# Patient Record
Sex: FEMALE | Race: WHITE | NOT HISPANIC OR LATINO | Employment: FULL TIME | URBAN - METROPOLITAN AREA
[De-identification: names, ages, dates, MRNs, and addresses within clinical notes are randomized per-mention and may not be internally consistent; named-entity substitution may affect disease eponyms.]

---

## 2019-01-20 ENCOUNTER — HOSPITAL ENCOUNTER (OUTPATIENT)
Dept: RADIOLOGY | Facility: MEDICAL CENTER | Age: 44
End: 2019-01-20

## 2019-01-20 ENCOUNTER — HOSPITAL ENCOUNTER (INPATIENT)
Facility: MEDICAL CENTER | Age: 44
LOS: 5 days | DRG: 981 | End: 2019-01-25
Attending: EMERGENCY MEDICINE | Admitting: SURGERY
Payer: COMMERCIAL

## 2019-01-20 DIAGNOSIS — N83.202 CYST OF LEFT OVARY: ICD-10-CM

## 2019-01-20 DIAGNOSIS — K63.1 PERFORATED BOWEL (HCC): ICD-10-CM

## 2019-01-20 DIAGNOSIS — N83.209 CYST OF OVARY, UNSPECIFIED LATERALITY: ICD-10-CM

## 2019-01-20 DIAGNOSIS — R10.0 ACUTE ABDOMEN: ICD-10-CM

## 2019-01-20 DIAGNOSIS — N85.8 UTERINE MASS: ICD-10-CM

## 2019-01-20 LAB
ABO GROUP BLD: NORMAL
APTT PPP: 23.1 SEC (ref 24.7–36)
BARCODED ABORH UBTYP: 5100
BARCODED PRD CODE UBPRD: NORMAL
BARCODED UNIT NUM UBUNT: NORMAL
BLD GP AB INVEST PLASRBC-IMP: NORMAL
BLD GP AB SCN SERPL QL: NORMAL
COMPONENT R 8504R: NORMAL
EKG IMPRESSION: NORMAL
INR PPP: 1.08 (ref 0.87–1.13)
LACTATE BLD-SCNC: 1.1 MMOL/L (ref 0.5–2)
PRODUCT TYPE UPROD: NORMAL
PROTHROMBIN TIME: 14.1 SEC (ref 12–14.6)
RH BLD: NORMAL
UNIT STATUS USTAT: NORMAL

## 2019-01-20 PROCEDURE — 501838 HCHG SUTURE GENERAL: Performed by: SURGERY

## 2019-01-20 PROCEDURE — 85730 THROMBOPLASTIN TIME PARTIAL: CPT

## 2019-01-20 PROCEDURE — 500424 HCHG DRESSING, AIRSTRIP: Performed by: SURGERY

## 2019-01-20 PROCEDURE — 87205 SMEAR GRAM STAIN: CPT

## 2019-01-20 PROCEDURE — 700102 HCHG RX REV CODE 250 W/ 637 OVERRIDE(OP): Performed by: ANESTHESIOLOGY

## 2019-01-20 PROCEDURE — 304561 HCHG STAT O2

## 2019-01-20 PROCEDURE — 86902 BLOOD TYPE ANTIGEN DONOR EA: CPT | Mod: 91

## 2019-01-20 PROCEDURE — A9270 NON-COVERED ITEM OR SERVICE: HCPCS | Performed by: SURGERY

## 2019-01-20 PROCEDURE — 160002 HCHG RECOVERY MINUTES (STAT): Performed by: SURGERY

## 2019-01-20 PROCEDURE — 0U910ZZ DRAINAGE OF LEFT OVARY, OPEN APPROACH: ICD-10-PCS | Performed by: SURGERY

## 2019-01-20 PROCEDURE — 160048 HCHG OR STATISTICAL LEVEL 1-5: Performed by: SURGERY

## 2019-01-20 PROCEDURE — 160029 HCHG SURGERY MINUTES - 1ST 30 MINS LEVEL 4: Performed by: SURGERY

## 2019-01-20 PROCEDURE — 86850 RBC ANTIBODY SCREEN: CPT

## 2019-01-20 PROCEDURE — 87070 CULTURE OTHR SPECIMN AEROBIC: CPT

## 2019-01-20 PROCEDURE — 770006 HCHG ROOM/CARE - MED/SURG/GYN SEMI*

## 2019-01-20 PROCEDURE — 96365 THER/PROPH/DIAG IV INF INIT: CPT

## 2019-01-20 PROCEDURE — 501445 HCHG STAPLER, SKIN DISP: Performed by: SURGERY

## 2019-01-20 PROCEDURE — 700111 HCHG RX REV CODE 636 W/ 250 OVERRIDE (IP): Performed by: EMERGENCY MEDICINE

## 2019-01-20 PROCEDURE — 700111 HCHG RX REV CODE 636 W/ 250 OVERRIDE (IP)

## 2019-01-20 PROCEDURE — 83605 ASSAY OF LACTIC ACID: CPT

## 2019-01-20 PROCEDURE — 85610 PROTHROMBIN TIME: CPT

## 2019-01-20 PROCEDURE — 500389 HCHG DRAIN, RESERVOIR SUCT JP 100CC: Performed by: SURGERY

## 2019-01-20 PROCEDURE — 86901 BLOOD TYPING SEROLOGIC RH(D): CPT

## 2019-01-20 PROCEDURE — 86905 BLOOD TYPING RBC ANTIGENS: CPT | Mod: 91

## 2019-01-20 PROCEDURE — 700105 HCHG RX REV CODE 258: Performed by: SURGERY

## 2019-01-20 PROCEDURE — A9270 NON-COVERED ITEM OR SERVICE: HCPCS | Performed by: ANESTHESIOLOGY

## 2019-01-20 PROCEDURE — 160035 HCHG PACU - 1ST 60 MINS PHASE I: Performed by: SURGERY

## 2019-01-20 PROCEDURE — 86900 BLOOD TYPING SEROLOGIC ABO: CPT

## 2019-01-20 PROCEDURE — 700101 HCHG RX REV CODE 250

## 2019-01-20 PROCEDURE — 86922 COMPATIBILITY TEST ANTIGLOB: CPT

## 2019-01-20 PROCEDURE — 87075 CULTR BACTERIA EXCEPT BLOOD: CPT

## 2019-01-20 PROCEDURE — 99291 CRITICAL CARE FIRST HOUR: CPT

## 2019-01-20 PROCEDURE — 160009 HCHG ANES TIME/MIN: Performed by: SURGERY

## 2019-01-20 PROCEDURE — 160041 HCHG SURGERY MINUTES - EA ADDL 1 MIN LEVEL 4: Performed by: SURGERY

## 2019-01-20 PROCEDURE — 160036 HCHG PACU - EA ADDL 30 MINS PHASE I: Performed by: SURGERY

## 2019-01-20 PROCEDURE — 700111 HCHG RX REV CODE 636 W/ 250 OVERRIDE (IP): Performed by: SURGERY

## 2019-01-20 PROCEDURE — 700111 HCHG RX REV CODE 636 W/ 250 OVERRIDE (IP): Performed by: ANESTHESIOLOGY

## 2019-01-20 PROCEDURE — 500375 HCHG DRAIN, J-P ROUND 10FR: Performed by: SURGERY

## 2019-01-20 PROCEDURE — 93005 ELECTROCARDIOGRAM TRACING: CPT | Performed by: EMERGENCY MEDICINE

## 2019-01-20 PROCEDURE — 86870 RBC ANTIBODY IDENTIFICATION: CPT

## 2019-01-20 PROCEDURE — 700105 HCHG RX REV CODE 258: Performed by: EMERGENCY MEDICINE

## 2019-01-20 PROCEDURE — 36415 COLL VENOUS BLD VENIPUNCTURE: CPT

## 2019-01-20 PROCEDURE — 94760 N-INVAS EAR/PLS OXIMETRY 1: CPT

## 2019-01-20 PROCEDURE — 700102 HCHG RX REV CODE 250 W/ 637 OVERRIDE(OP): Performed by: SURGERY

## 2019-01-20 PROCEDURE — 96375 TX/PRO/DX INJ NEW DRUG ADDON: CPT

## 2019-01-20 RX ORDER — HALOPERIDOL 5 MG/ML
1 INJECTION INTRAMUSCULAR
Status: DISCONTINUED | OUTPATIENT
Start: 2019-01-20 | End: 2019-01-21

## 2019-01-20 RX ORDER — ACETAMINOPHEN 500 MG
1000 TABLET ORAL EVERY 6 HOURS
Status: DISCONTINUED | OUTPATIENT
Start: 2019-01-21 | End: 2019-01-25 | Stop reason: HOSPADM

## 2019-01-20 RX ORDER — HYDROMORPHONE HYDROCHLORIDE 1 MG/ML
0.2 INJECTION, SOLUTION INTRAMUSCULAR; INTRAVENOUS; SUBCUTANEOUS
Status: DISCONTINUED | OUTPATIENT
Start: 2019-01-20 | End: 2019-01-21

## 2019-01-20 RX ORDER — HYDROMORPHONE HYDROCHLORIDE 1 MG/ML
0.1 INJECTION, SOLUTION INTRAMUSCULAR; INTRAVENOUS; SUBCUTANEOUS
Status: DISCONTINUED | OUTPATIENT
Start: 2019-01-20 | End: 2019-01-21

## 2019-01-20 RX ORDER — OXYCODONE HCL 5 MG/5 ML
10 SOLUTION, ORAL ORAL
Status: COMPLETED | OUTPATIENT
Start: 2019-01-20 | End: 2019-01-20

## 2019-01-20 RX ORDER — HYDRALAZINE HYDROCHLORIDE 20 MG/ML
5 INJECTION INTRAMUSCULAR; INTRAVENOUS
Status: DISCONTINUED | OUTPATIENT
Start: 2019-01-20 | End: 2019-01-21

## 2019-01-20 RX ORDER — HYDROMORPHONE HYDROCHLORIDE 1 MG/ML
0.4 INJECTION, SOLUTION INTRAMUSCULAR; INTRAVENOUS; SUBCUTANEOUS
Status: DISCONTINUED | OUTPATIENT
Start: 2019-01-20 | End: 2019-01-21

## 2019-01-20 RX ORDER — SODIUM CHLORIDE, SODIUM LACTATE, POTASSIUM CHLORIDE, CALCIUM CHLORIDE 600; 310; 30; 20 MG/100ML; MG/100ML; MG/100ML; MG/100ML
INJECTION, SOLUTION INTRAVENOUS CONTINUOUS
Status: DISCONTINUED | OUTPATIENT
Start: 2019-01-20 | End: 2019-01-21

## 2019-01-20 RX ORDER — OXYCODONE HYDROCHLORIDE 5 MG/1
5 TABLET ORAL
Status: DISCONTINUED | OUTPATIENT
Start: 2019-01-20 | End: 2019-01-25 | Stop reason: HOSPADM

## 2019-01-20 RX ORDER — METOPROLOL TARTRATE 1 MG/ML
1 INJECTION, SOLUTION INTRAVENOUS
Status: DISCONTINUED | OUTPATIENT
Start: 2019-01-20 | End: 2019-01-21

## 2019-01-20 RX ORDER — OXYCODONE HCL 5 MG/5 ML
5 SOLUTION, ORAL ORAL
Status: COMPLETED | OUTPATIENT
Start: 2019-01-20 | End: 2019-01-20

## 2019-01-20 RX ORDER — KETOROLAC TROMETHAMINE 30 MG/ML
30 INJECTION, SOLUTION INTRAMUSCULAR; INTRAVENOUS EVERY 6 HOURS
Status: COMPLETED | OUTPATIENT
Start: 2019-01-21 | End: 2019-01-23

## 2019-01-20 RX ORDER — SODIUM CHLORIDE 9 MG/ML
INJECTION, SOLUTION INTRAVENOUS CONTINUOUS
Status: DISCONTINUED | OUTPATIENT
Start: 2019-01-20 | End: 2019-01-21

## 2019-01-20 RX ORDER — HYDROMORPHONE HYDROCHLORIDE 1 MG/ML
0.5 INJECTION, SOLUTION INTRAMUSCULAR; INTRAVENOUS; SUBCUTANEOUS ONCE
Status: COMPLETED | OUTPATIENT
Start: 2019-01-20 | End: 2019-01-20

## 2019-01-20 RX ORDER — OXYCODONE HYDROCHLORIDE 10 MG/1
10 TABLET ORAL
Status: DISCONTINUED | OUTPATIENT
Start: 2019-01-20 | End: 2019-01-25 | Stop reason: HOSPADM

## 2019-01-20 RX ORDER — DIPHENHYDRAMINE HYDROCHLORIDE 50 MG/ML
12.5 INJECTION INTRAMUSCULAR; INTRAVENOUS
Status: DISCONTINUED | OUTPATIENT
Start: 2019-01-20 | End: 2019-01-25 | Stop reason: HOSPADM

## 2019-01-20 RX ORDER — ONDANSETRON 2 MG/ML
4 INJECTION INTRAMUSCULAR; INTRAVENOUS EVERY 4 HOURS PRN
Status: DISCONTINUED | OUTPATIENT
Start: 2019-01-20 | End: 2019-01-25 | Stop reason: HOSPADM

## 2019-01-20 RX ORDER — MEPERIDINE HYDROCHLORIDE 25 MG/ML
12.5 INJECTION INTRAMUSCULAR; INTRAVENOUS; SUBCUTANEOUS
Status: DISCONTINUED | OUTPATIENT
Start: 2019-01-20 | End: 2019-01-21

## 2019-01-20 RX ORDER — SODIUM CHLORIDE, SODIUM LACTATE, POTASSIUM CHLORIDE, CALCIUM CHLORIDE 600; 310; 30; 20 MG/100ML; MG/100ML; MG/100ML; MG/100ML
INJECTION, SOLUTION INTRAVENOUS CONTINUOUS
Status: DISCONTINUED | OUTPATIENT
Start: 2019-01-20 | End: 2019-01-22

## 2019-01-20 RX ORDER — ONDANSETRON 2 MG/ML
4 INJECTION INTRAMUSCULAR; INTRAVENOUS
Status: DISCONTINUED | OUTPATIENT
Start: 2019-01-20 | End: 2019-01-21

## 2019-01-20 RX ADMIN — SODIUM CHLORIDE: 9 INJECTION, SOLUTION INTRAVENOUS at 17:39

## 2019-01-20 RX ADMIN — FENTANYL CITRATE 50 MCG: 50 INJECTION, SOLUTION INTRAMUSCULAR; INTRAVENOUS at 21:34

## 2019-01-20 RX ADMIN — HYDROMORPHONE HYDROCHLORIDE 0.4 MG: 1 INJECTION, SOLUTION INTRAMUSCULAR; INTRAVENOUS; SUBCUTANEOUS at 21:13

## 2019-01-20 RX ADMIN — OXYCODONE HYDROCHLORIDE 10 MG: 5 SOLUTION ORAL at 23:51

## 2019-01-20 RX ADMIN — KETOROLAC TROMETHAMINE 30 MG: 30 INJECTION, SOLUTION INTRAMUSCULAR; INTRAVENOUS at 23:43

## 2019-01-20 RX ADMIN — HYDROMORPHONE HYDROCHLORIDE 0.5 MG: 1 INJECTION, SOLUTION INTRAMUSCULAR; INTRAVENOUS; SUBCUTANEOUS at 17:37

## 2019-01-20 RX ADMIN — FENTANYL CITRATE 50 MCG: 50 INJECTION, SOLUTION INTRAMUSCULAR; INTRAVENOUS at 21:01

## 2019-01-20 RX ADMIN — ACETAMINOPHEN 1000 MG: 500 TABLET ORAL at 23:43

## 2019-01-20 RX ADMIN — HYDROMORPHONE HYDROCHLORIDE 0.2 MG: 1 INJECTION, SOLUTION INTRAMUSCULAR; INTRAVENOUS; SUBCUTANEOUS at 21:20

## 2019-01-20 RX ADMIN — HYDROMORPHONE HYDROCHLORIDE 0.4 MG: 1 INJECTION, SOLUTION INTRAMUSCULAR; INTRAVENOUS; SUBCUTANEOUS at 21:07

## 2019-01-20 RX ADMIN — CEFTRIAXONE SODIUM 2 G: 2 INJECTION, POWDER, FOR SOLUTION INTRAMUSCULAR; INTRAVENOUS at 17:23

## 2019-01-20 RX ADMIN — SODIUM CHLORIDE, POTASSIUM CHLORIDE, SODIUM LACTATE AND CALCIUM CHLORIDE: 600; 310; 30; 20 INJECTION, SOLUTION INTRAVENOUS at 22:37

## 2019-01-20 ASSESSMENT — LIFESTYLE VARIABLES
EVER_SMOKED: YES
HAVE PEOPLE ANNOYED YOU BY CRITICIZING YOUR DRINKING: YES
EVER HAD A DRINK FIRST THING IN THE MORNING TO STEADY YOUR NERVES TO GET RID OF A HANGOVER: YES
TOTAL SCORE: 4
ALCOHOL_USE: YES
ON A TYPICAL DAY WHEN YOU DRINK ALCOHOL HOW MANY DRINKS DO YOU HAVE: 5
CONSUMPTION TOTAL: POSITIVE
EVER FELT BAD OR GUILTY ABOUT YOUR DRINKING: YES
AVERAGE NUMBER OF DAYS PER WEEK YOU HAVE A DRINK CONTAINING ALCOHOL: 7
DOES PATIENT WANT TO STOP DRINKING: YES
TOTAL SCORE: 4
HAVE YOU EVER FELT YOU SHOULD CUT DOWN ON YOUR DRINKING: YES
DOES PATIENT WANT TO TALK TO SOMEONE ABOUT QUITTING: NO
HOW MANY TIMES IN THE PAST YEAR HAVE YOU HAD 5 OR MORE DRINKS IN A DAY: 365
TOTAL SCORE: 4

## 2019-01-20 ASSESSMENT — PAIN SCALES - GENERAL
PAINLEVEL_OUTOF10: 5
PAINLEVEL_OUTOF10: 8
PAINLEVEL_OUTOF10: 5
PAINLEVEL_OUTOF10: 6
PAINLEVEL_OUTOF10: 5
PAINLEVEL_OUTOF10: 7
PAINLEVEL_OUTOF10: 8

## 2019-01-20 ASSESSMENT — COGNITIVE AND FUNCTIONAL STATUS - GENERAL
DAILY ACTIVITIY SCORE: 18
EATING MEALS: A LITTLE
WALKING IN HOSPITAL ROOM: A LITTLE
MOBILITY SCORE: 18
DRESSING REGULAR UPPER BODY CLOTHING: A LITTLE
PERSONAL GROOMING: A LITTLE
TOILETING: A LITTLE
MOVING TO AND FROM BED TO CHAIR: A LITTLE
MOVING FROM LYING ON BACK TO SITTING ON SIDE OF FLAT BED: A LITTLE
HELP NEEDED FOR BATHING: A LITTLE
STANDING UP FROM CHAIR USING ARMS: A LITTLE
CLIMB 3 TO 5 STEPS WITH RAILING: A LITTLE
SUGGESTED CMS G CODE MODIFIER DAILY ACTIVITY: CK
SUGGESTED CMS G CODE MODIFIER MOBILITY: CK
TURNING FROM BACK TO SIDE WHILE IN FLAT BAD: A LITTLE
DRESSING REGULAR LOWER BODY CLOTHING: A LITTLE

## 2019-01-20 ASSESSMENT — PATIENT HEALTH QUESTIONNAIRE - PHQ9
1. LITTLE INTEREST OR PLEASURE IN DOING THINGS: NOT AT ALL
SUM OF ALL RESPONSES TO PHQ9 QUESTIONS 1 AND 2: 0
2. FEELING DOWN, DEPRESSED, IRRITABLE, OR HOPELESS: NOT AT ALL

## 2019-01-20 ASSESSMENT — COPD QUESTIONNAIRES
COPD SCREENING SCORE: 2
DO YOU EVER COUGH UP ANY MUCUS OR PHLEGM?: NO/ONLY WITH OCCASIONAL COLDS OR INFECTIONS
IN THE PAST 12 MONTHS DO YOU DO LESS THAN YOU USED TO BECAUSE OF YOUR BREATHING PROBLEMS: DISAGREE/UNSURE
HAVE YOU SMOKED AT LEAST 100 CIGARETTES IN YOUR ENTIRE LIFE: YES
DURING THE PAST 4 WEEKS HOW MUCH DID YOU FEEL SHORT OF BREATH: NONE/LITTLE OF THE TIME

## 2019-01-20 ASSESSMENT — PAIN DESCRIPTION - DESCRIPTORS: DESCRIPTORS: CRAMPING;SHARP

## 2019-01-21 PROBLEM — R10.0 ACUTE ABDOMEN: Status: ACTIVE | Noted: 2019-01-21

## 2019-01-21 PROBLEM — D64.9 ANEMIA: Status: ACTIVE | Noted: 2019-01-21

## 2019-01-21 LAB
ABO GROUP BLD: NORMAL
ALBUMIN SERPL BCP-MCNC: 3.4 G/DL (ref 3.2–4.9)
ALBUMIN/GLOB SERPL: 1.1 G/DL
ALP SERPL-CCNC: 43 U/L (ref 30–99)
ALT SERPL-CCNC: 7 U/L (ref 2–50)
ANION GAP SERPL CALC-SCNC: 4 MMOL/L (ref 0–11.9)
ANISOCYTOSIS BLD QL SMEAR: ABNORMAL
AST SERPL-CCNC: 11 U/L (ref 12–45)
BASOPHILS # BLD AUTO: 0.2 % (ref 0–1.8)
BASOPHILS # BLD AUTO: 0.3 % (ref 0–1.8)
BASOPHILS # BLD: 0.04 K/UL (ref 0–0.12)
BASOPHILS # BLD: 0.05 K/UL (ref 0–0.12)
BILIRUB SERPL-MCNC: 0.5 MG/DL (ref 0.1–1.5)
BUN SERPL-MCNC: 12 MG/DL (ref 8–22)
CALCIUM SERPL-MCNC: 8.7 MG/DL (ref 8.5–10.5)
CHLORIDE SERPL-SCNC: 110 MMOL/L (ref 96–112)
CO2 SERPL-SCNC: 24 MMOL/L (ref 20–33)
CREAT SERPL-MCNC: 0.78 MG/DL (ref 0.5–1.4)
EOSINOPHIL # BLD AUTO: 0.05 K/UL (ref 0–0.51)
EOSINOPHIL # BLD AUTO: 0.23 K/UL (ref 0–0.51)
EOSINOPHIL NFR BLD: 0.3 % (ref 0–6.9)
EOSINOPHIL NFR BLD: 1.2 % (ref 0–6.9)
ERYTHROCYTE [DISTWIDTH] IN BLOOD BY AUTOMATED COUNT: 48.5 FL (ref 35.9–50)
ERYTHROCYTE [DISTWIDTH] IN BLOOD BY AUTOMATED COUNT: 54.8 FL (ref 35.9–50)
GLOBULIN SER CALC-MCNC: 3.2 G/DL (ref 1.9–3.5)
GLUCOSE SERPL-MCNC: 99 MG/DL (ref 65–99)
GRAM STN SPEC: NORMAL
HCT VFR BLD AUTO: 18.7 % (ref 37–47)
HCT VFR BLD AUTO: 24 % (ref 37–47)
HGB BLD-MCNC: 4.9 G/DL (ref 12–16)
HGB BLD-MCNC: 6.8 G/DL (ref 12–16)
HYPOCHROMIA BLD QL SMEAR: ABNORMAL
IMM GRANULOCYTES # BLD AUTO: 0.14 K/UL (ref 0–0.11)
IMM GRANULOCYTES # BLD AUTO: 0.17 K/UL (ref 0–0.11)
IMM GRANULOCYTES NFR BLD AUTO: 0.8 % (ref 0–0.9)
IMM GRANULOCYTES NFR BLD AUTO: 0.9 % (ref 0–0.9)
LYMPHOCYTES # BLD AUTO: 1.14 K/UL (ref 1–4.8)
LYMPHOCYTES # BLD AUTO: 1.46 K/UL (ref 1–4.8)
LYMPHOCYTES NFR BLD: 5.7 % (ref 22–41)
LYMPHOCYTES NFR BLD: 7.9 % (ref 22–41)
MACROCYTES BLD QL SMEAR: ABNORMAL
MAGNESIUM SERPL-MCNC: 2.5 MG/DL (ref 1.5–2.5)
MCH RBC QN AUTO: 17.6 PG (ref 27–33)
MCH RBC QN AUTO: 20.1 PG (ref 27–33)
MCHC RBC AUTO-ENTMCNC: 26.2 G/DL (ref 33.6–35)
MCHC RBC AUTO-ENTMCNC: 28.3 G/DL (ref 33.6–35)
MCV RBC AUTO: 67 FL (ref 81.4–97.8)
MCV RBC AUTO: 71 FL (ref 81.4–97.8)
MICROCYTES BLD QL SMEAR: ABNORMAL
MONOCYTES # BLD AUTO: 1.26 K/UL (ref 0–0.85)
MONOCYTES # BLD AUTO: 1.46 K/UL (ref 0–0.85)
MONOCYTES NFR BLD AUTO: 6.3 % (ref 0–13.4)
MONOCYTES NFR BLD AUTO: 7.9 % (ref 0–13.4)
NEUTROPHILS # BLD AUTO: 15.42 K/UL (ref 2–7.15)
NEUTROPHILS # BLD AUTO: 17.11 K/UL (ref 2–7.15)
NEUTROPHILS NFR BLD: 82.9 % (ref 44–72)
NEUTROPHILS NFR BLD: 85.6 % (ref 44–72)
NRBC # BLD AUTO: 0 K/UL
NRBC # BLD AUTO: 0.03 K/UL
NRBC BLD-RTO: 0 /100 WBC
NRBC BLD-RTO: 0.2 /100 WBC
OVALOCYTES BLD QL SMEAR: NORMAL
PHOSPHATE SERPL-MCNC: 4.2 MG/DL (ref 2.5–4.5)
PLATELET # BLD AUTO: 342 K/UL (ref 164–446)
PLATELET # BLD AUTO: 380 K/UL (ref 164–446)
PLATELET BLD QL SMEAR: ADEQUATE
PMV BLD AUTO: 8.7 FL (ref 9–12.9)
PMV BLD AUTO: 8.7 FL (ref 9–12.9)
POLYCHROMASIA BLD QL SMEAR: NORMAL
POTASSIUM SERPL-SCNC: 3.7 MMOL/L (ref 3.6–5.5)
PROT SERPL-MCNC: 6.6 G/DL (ref 6–8.2)
RBC # BLD AUTO: 2.79 M/UL (ref 4.2–5.4)
RBC # BLD AUTO: 3.38 M/UL (ref 4.2–5.4)
RBC BLD AUTO: PRESENT
RH BLD: NORMAL
SIGNIFICANT IND 70042: NORMAL
SITE SITE: NORMAL
SODIUM SERPL-SCNC: 138 MMOL/L (ref 135–145)
SOURCE SOURCE: NORMAL
WBC # BLD AUTO: 18.6 K/UL (ref 4.8–10.8)
WBC # BLD AUTO: 20 K/UL (ref 4.8–10.8)

## 2019-01-21 PROCEDURE — 700105 HCHG RX REV CODE 258

## 2019-01-21 PROCEDURE — 85025 COMPLETE CBC W/AUTO DIFF WBC: CPT

## 2019-01-21 PROCEDURE — 700102 HCHG RX REV CODE 250 W/ 637 OVERRIDE(OP): Performed by: SURGERY

## 2019-01-21 PROCEDURE — 86922 COMPATIBILITY TEST ANTIGLOB: CPT | Mod: 91

## 2019-01-21 PROCEDURE — P9016 RBC LEUKOCYTES REDUCED: HCPCS

## 2019-01-21 PROCEDURE — 83735 ASSAY OF MAGNESIUM: CPT

## 2019-01-21 PROCEDURE — 30233N1 TRANSFUSION OF NONAUTOLOGOUS RED BLOOD CELLS INTO PERIPHERAL VEIN, PERCUTANEOUS APPROACH: ICD-10-PCS | Performed by: SURGERY

## 2019-01-21 PROCEDURE — 770006 HCHG ROOM/CARE - MED/SURG/GYN SEMI*

## 2019-01-21 PROCEDURE — 700111 HCHG RX REV CODE 636 W/ 250 OVERRIDE (IP): Performed by: SURGERY

## 2019-01-21 PROCEDURE — 80053 COMPREHEN METABOLIC PANEL: CPT

## 2019-01-21 PROCEDURE — 84100 ASSAY OF PHOSPHORUS: CPT

## 2019-01-21 PROCEDURE — 86902 BLOOD TYPE ANTIGEN DONOR EA: CPT | Mod: 91

## 2019-01-21 PROCEDURE — 700105 HCHG RX REV CODE 258: Performed by: SURGERY

## 2019-01-21 PROCEDURE — 36415 COLL VENOUS BLD VENIPUNCTURE: CPT

## 2019-01-21 PROCEDURE — 99233 SBSQ HOSP IP/OBS HIGH 50: CPT | Performed by: OBSTETRICS & GYNECOLOGY

## 2019-01-21 PROCEDURE — 36430 TRANSFUSION BLD/BLD COMPNT: CPT

## 2019-01-21 PROCEDURE — A9270 NON-COVERED ITEM OR SERVICE: HCPCS | Performed by: SURGERY

## 2019-01-21 RX ORDER — SODIUM CHLORIDE 9 MG/ML
INJECTION, SOLUTION INTRAVENOUS
Status: COMPLETED
Start: 2019-01-21 | End: 2019-01-21

## 2019-01-21 RX ADMIN — SODIUM CHLORIDE 1000 ML: 9 INJECTION, SOLUTION INTRAVENOUS at 09:56

## 2019-01-21 RX ADMIN — OXYCODONE HYDROCHLORIDE 5 MG: 5 TABLET ORAL at 14:37

## 2019-01-21 RX ADMIN — KETOROLAC TROMETHAMINE 30 MG: 30 INJECTION, SOLUTION INTRAMUSCULAR; INTRAVENOUS at 05:59

## 2019-01-21 RX ADMIN — OXYCODONE HYDROCHLORIDE 10 MG: 10 TABLET ORAL at 03:14

## 2019-01-21 RX ADMIN — KETOROLAC TROMETHAMINE 30 MG: 30 INJECTION, SOLUTION INTRAMUSCULAR; INTRAVENOUS at 23:57

## 2019-01-21 RX ADMIN — OXYCODONE HYDROCHLORIDE 5 MG: 5 TABLET ORAL at 09:13

## 2019-01-21 RX ADMIN — ACETAMINOPHEN 1000 MG: 500 TABLET ORAL at 11:14

## 2019-01-21 RX ADMIN — KETOROLAC TROMETHAMINE 30 MG: 30 INJECTION, SOLUTION INTRAMUSCULAR; INTRAVENOUS at 18:35

## 2019-01-21 RX ADMIN — KETOROLAC TROMETHAMINE 30 MG: 30 INJECTION, SOLUTION INTRAMUSCULAR; INTRAVENOUS at 11:14

## 2019-01-21 RX ADMIN — SODIUM CHLORIDE: 9 INJECTION, SOLUTION INTRAVENOUS at 05:15

## 2019-01-21 RX ADMIN — ACETAMINOPHEN 1000 MG: 500 TABLET ORAL at 05:59

## 2019-01-21 RX ADMIN — SODIUM CHLORIDE, POTASSIUM CHLORIDE, SODIUM LACTATE AND CALCIUM CHLORIDE: 600; 310; 30; 20 INJECTION, SOLUTION INTRAVENOUS at 23:57

## 2019-01-21 RX ADMIN — SODIUM CHLORIDE, POTASSIUM CHLORIDE, SODIUM LACTATE AND CALCIUM CHLORIDE: 600; 310; 30; 20 INJECTION, SOLUTION INTRAVENOUS at 11:11

## 2019-01-21 RX ADMIN — ACETAMINOPHEN 1000 MG: 500 TABLET ORAL at 18:35

## 2019-01-21 RX ADMIN — ACETAMINOPHEN 1000 MG: 500 TABLET ORAL at 23:57

## 2019-01-21 RX ADMIN — ENOXAPARIN SODIUM 40 MG: 100 INJECTION SUBCUTANEOUS at 05:59

## 2019-01-21 ASSESSMENT — ENCOUNTER SYMPTOMS
MUSCULOSKELETAL NEGATIVE: 1
HEADACHES: 0
VOMITING: 0
PSYCHIATRIC NEGATIVE: 1
SHORTNESS OF BREATH: 0
COUGH: 0
DIZZINESS: 0
HEARTBURN: 0
NEUROLOGICAL NEGATIVE: 1
ABDOMINAL PAIN: 1
NAUSEA: 0
EYES NEGATIVE: 1

## 2019-01-21 ASSESSMENT — PAIN SCALES - GENERAL
PAINLEVEL_OUTOF10: 6
PAINLEVEL_OUTOF10: 6
PAINLEVEL_OUTOF10: 5
PAINLEVEL_OUTOF10: 0
PAINLEVEL_OUTOF10: 0
PAINLEVEL_OUTOF10: 4
PAINLEVEL_OUTOF10: 4
PAINLEVEL_OUTOF10: 5
PAINLEVEL_OUTOF10: 0
PAINLEVEL_OUTOF10: 8
PAINLEVEL_OUTOF10: 0
PAINLEVEL_OUTOF10: 8
PAINLEVEL_OUTOF10: 6

## 2019-01-21 NOTE — PROGRESS NOTES
Pt AAOx4.  No c/o pain this morning.  Pt received 1 unit of RBCs this morning.   MLI with island dressing in place. Shadowing outlined.  Hathaway removed at 0730, pt educated on need to void by 1330.  Clear liquid diet in place, no c/o n/v.  LBM PTA, + flatus.  POC reviewed with pt.  Call light within reach, pt educated to call for assistance as needed.  Hourly rounding in place.

## 2019-01-21 NOTE — PROGRESS NOTES
Seen  perforated viscous  Will need ex-lap  Discussed risks, benefits and possibility of ostomy  Wishes to proceed

## 2019-01-21 NOTE — PROGRESS NOTES
Pt alert and oriented.  Complaints of pain, meds given with positive results.  Denies nausea.  Incision on abd with island dressing, scant drainage.  Hathaway patent.

## 2019-01-21 NOTE — ASSESSMENT & PLAN NOTE
Peritonitis on exam. CT scan done at an outside facility, demonstrated free air.  OR for exploratory laparotomy with ovarian cyst excision  Reynaldo Gilmore MD Surgery

## 2019-01-21 NOTE — PROGRESS NOTES
Trauma / Surgical Daily Progress Note    Date of Service  1/21/2019    Chief Complaint  43 y.o. female admitted 1/20/2019 with Acute abdomen    Interval Events  POD #1 exploratory laparotomy with ovarian cyst excision  Hemoglobin down to 4.9 this AM / preop hemoglobin was 6.3 (per patient that is her baseline)  No signs of overt bleeding. Vital signs stable. Fatigued.  First unit of PRBCs infusing.  Abdomen soft. Tolerating clear liquids.    Review of Systems  Review of Systems   Constitutional: Positive for malaise/fatigue.   HENT: Negative.    Eyes: Negative.    Respiratory: Negative for cough and shortness of breath.    Cardiovascular: Negative for chest pain.   Gastrointestinal: Positive for abdominal pain (incisional pain). Negative for heartburn, nausea and vomiting.        BM pta  (-) flatus   Genitourinary: Negative.         Voiding    Musculoskeletal: Negative.    Skin: Negative.    Neurological: Negative.  Negative for dizziness and headaches.   Psychiatric/Behavioral: Negative.       Vital Signs  Temp:  [36.4 °C (97.6 °F)-36.9 °C (98.4 °F)] 36.8 °C (98.2 °F)  Pulse:  [65-94] 82  Resp:  [16-27] 16  BP: ()/(60-73) 113/67  SpO2:  [92 %-100 %] 95 %    Physical Exam  Physical Exam   Constitutional: She is oriented to person, place, and time. She appears well-developed and well-nourished. No distress.   HENT:   Head: Normocephalic.   Eyes: Conjunctivae are normal.   Neck: Neck supple. No JVD present.   Cardiovascular: Normal rate and intact distal pulses.    Pulmonary/Chest: Effort normal. No respiratory distress.   Supplemental oxygen    Abdominal: Soft. There is tenderness (midline incision ). There is no rebound and no guarding.   Midline incision with minimal dry drainage.   Musculoskeletal: Normal range of motion. She exhibits no edema.   Neurological: She is alert and oriented to person, place, and time.   Lethargic    Skin: Skin is warm and dry.   Psychiatric: She has a normal mood and affect.        Laboratory  Recent Results (from the past 24 hour(s))   PROTHROMBIN TIME (INR)    Collection Time: 19  5:18 PM   Result Value Ref Range    PT 14.1 12.0 - 14.6 sec    INR 1.08 0.87 - 1.13   APTT    Collection Time: 19  5:18 PM   Result Value Ref Range    APTT 23.1 (L) 24.7 - 36.0 sec   COD (ADULT)    Collection Time: 19  5:18 PM   Result Value Ref Range    ABO Grouping Only AB     Rh Grouping Only POS     Antibody Screen-Cod POS     Component R       R3                  Red Blood Cells3    [id number]   selected     19   19:25      Product Type Red Blood Cells LR Pheresis     Dispense Status Selected     Unit Number (Barcoded) [id number]     Product Code (Barcoded) T7875I11     Blood Type (Barcoded) 5100     Component R       R3                  Red Blood Cells3    [id number]   issued       19   05:12      Product Type Red Blood Cells LR Pheresis     Dispense Status Issued     Unit Number (Barcoded) [id number]     Product Code (Barcoded) S2360P19     Blood Type (Barcoded) 5100    LACTIC ACID    Collection Time: 19  5:18 PM   Result Value Ref Range    Lactic Acid 1.1 0.5 - 2.0 mmol/L   ANTIBODY IDENTIFICATION    Collection Time: 19  5:18 PM   Result Value Ref Range    Antibody Id POS, anti-E    EKG (NOW)    Collection Time: 19  5:33 PM   Result Value Ref Range    Report       [facility] Emergency Dept.    Test Date:  2019  Pt Name:    CARLOS SOTO             Department: ER  MRN:        [id number]                      Room:       [facility]  Gender:     Female                       Technician: [id number]  :        1975                   Requested By:MAURICIO LITTLEJOHN  Order #:    [id number]                    Reading MD: MAURICIO LITTLEJOHN MD    Measurements  Intervals                                Axis  Rate:       83                           P:          50  VT:         156                          QRS:        10  QRSD:       88                            T:          73  QT:         420  QTc:        494    Interpretive Statements  SINUS RHYTHM at a rate of 83  BORDERLINE PROLONGED QT INTERVAL  T wave flattening in the inferior leads  No previous ECG available for comparison    Electronically Signed On 1- 18:26:42 PST by MAURICIO LITTLEJOHN MD     ABO AND RH CONFIRMATION    Collection Time: 01/21/19  2:55 AM   Result Value Ref Range    ABO Confirm AB     Second Rh Group POS    Comp Metabolic Panel (CMP): Tomorrow AM    Collection Time: 01/21/19  2:59 AM   Result Value Ref Range    Sodium 138 135 - 145 mmol/L    Potassium 3.7 3.6 - 5.5 mmol/L    Chloride 110 96 - 112 mmol/L    Co2 24 20 - 33 mmol/L    Anion Gap 4.0 0.0 - 11.9    Glucose 99 65 - 99 mg/dL    Bun 12 8 - 22 mg/dL    Creatinine 0.78 0.50 - 1.40 mg/dL    Calcium 8.7 8.5 - 10.5 mg/dL    AST(SGOT) 11 (L) 12 - 45 U/L    ALT(SGPT) 7 2 - 50 U/L    Alkaline Phosphatase 43 30 - 99 U/L    Total Bilirubin 0.5 0.1 - 1.5 mg/dL    Albumin 3.4 3.2 - 4.9 g/dL    Total Protein 6.6 6.0 - 8.2 g/dL    Globulin 3.2 1.9 - 3.5 g/dL    A-G Ratio 1.1 g/dL   Magnesium: Every Monday and Thursday AM    Collection Time: 01/21/19  2:59 AM   Result Value Ref Range    Magnesium 2.5 1.5 - 2.5 mg/dL   Phosphorus: Every Monday and Thursday AM    Collection Time: 01/21/19  2:59 AM   Result Value Ref Range    Phosphorus 4.2 2.5 - 4.5 mg/dL   ESTIMATED GFR    Collection Time: 01/21/19  2:59 AM   Result Value Ref Range    GFR If African American >60 >60 mL/min/1.73 m 2    GFR If Non African American >60 >60 mL/min/1.73 m 2   CBC WITH DIFFERENTIAL    Collection Time: 01/21/19  4:09 AM   Result Value Ref Range    WBC 18.6 (H) 4.8 - 10.8 K/uL    RBC 2.79 (L) 4.20 - 5.40 M/uL    Hemoglobin 4.9 (LL) 12.0 - 16.0 g/dL    Hematocrit 18.7 (L) 37.0 - 47.0 %    MCV 67.0 (L) 81.4 - 97.8 fL    MCH 17.6 (L) 27.0 - 33.0 pg    MCHC 26.2 (L) 33.6 - 35.0 g/dL    RDW 48.5 35.9 - 50.0 fL    Platelet Count 380 164 - 446 K/uL    MPV 8.7 (L)  9.0 - 12.9 fL    Neutrophils-Polys 82.90 (H) 44.00 - 72.00 %    Lymphocytes 7.90 (L) 22.00 - 41.00 %    Monocytes 7.90 0.00 - 13.40 %    Eosinophils 0.30 0.00 - 6.90 %    Basophils 0.20 0.00 - 1.80 %    Immature Granulocytes 0.80 0.00 - 0.90 %    Nucleated RBC 0.00 /100 WBC    Neutrophils (Absolute) 15.42 (H) 2.00 - 7.15 K/uL    Lymphs (Absolute) 1.46 1.00 - 4.80 K/uL    Monos (Absolute) 1.46 (H) 0.00 - 0.85 K/uL    Eos (Absolute) 0.05 0.00 - 0.51 K/uL    Baso (Absolute) 0.04 0.00 - 0.12 K/uL    Immature Granulocytes (abs) 0.14 (H) 0.00 - 0.11 K/uL    NRBC (Absolute) 0.00 K/uL    Hypochromia 3+     Anisocytosis 2+     Macrocytosis 1+     Microcytosis 2+    MORPHOLOGY    Collection Time: 01/21/19  4:09 AM   Result Value Ref Range    RBC Morphology Present     Polychromia 1+     Ovalocytes 1+    PLATELET ESTIMATE    Collection Time: 01/21/19  4:09 AM   Result Value Ref Range    Plt Estimation Adequate        Fluids    Intake/Output Summary (Last 24 hours) at 01/21/19 0626  Last data filed at 01/21/19 0351   Gross per 24 hour   Intake              100 ml   Output              400 ml   Net             -300 ml       Core Measures & Quality Metrics  Labs reviewed and Medications reviewed        DVT Prophylaxis: Enoxaparin (Lovenox)  DVT prophylaxis - mechanical: SCDs  Ulcer prophylaxis: Yes    Assessed for rehab: Patient returned to prior level of function, rehabilitation not indicated at this time    AZEB Score  ETOH Screening    Assessment/Plan  Anemia- (present on admission)   Assessment & Plan    Hemoglobin at sending facility 6.3  1/21 hemoglobin down to 4.9  Transfuse 2 units of PRBCs   Repeat hemogram at noon     Acute abdomen- (present on admission)   Assessment & Plan    Peritonitis on exam. CT scan done at an outside facility, demonstrated free air.  OR for exploratory laparotomy with ovarian cyst excision  Reynaldo Gilmore MD Surgery         Discussed patient condition with RN, Patient and general surgery   Deirdre

## 2019-01-21 NOTE — ED PROVIDER NOTES
ED Provider Note    Scribed for Ileana Bailey M.D. by Joseph Harris. 1/20/2019, 4:43 PM.    Primary care provider: None noted  Means of arrival: EMS  History obtained from: Patient  History limited by: None    CHIEF COMPLAINT  Chief Complaint   Patient presents with   • GI Problem       HPI  Beatrice Kolb is a 43 y.o. female who presents to the Emergency Department as a transfer patient from [facility], where she initially went for abdominal pain. She states she woke up this morning, and went to use the bathroom for the first time in a week, where she passed a blood clot from her vagina, which she notes is not abnormal as she is finishing her menstrual period. After using the bathroom she began to have very sharp lower abdominal pain which then began to radiate up into her abdomen. Her abdominal pain is somewhat alleviated when she bends over, and no specific exacerbating factors are identified. She has a history of a caesarian section, but no other surgical history. Beatrice denies any vaginal discharge, dysuria, polyuria or hematuria.    Beatrice refused a blood transfusion while at [place] as she does not want them unless they are absolutely necessary to save her life. She received 2 does of 0.5 mg of Dilaudid, 30 mg of Toradol, 500 mg of Flagyl, Magnesium, and 1 L of NS prior to arrival.    REVIEW OF SYSTEMS  Pertinent positives include abdominal pain, constipation. Pertinent negatives include no vaginal discharge, dysuria, polyuria.   As above, all other systems reviewed and are negative.   See HPI for further details.     PAST MEDICAL HISTORY  History reviewed. No pertinent past medical history.    SURGICAL HISTORY  History reviewed. No pertinent surgical history.    SOCIAL HISTORY  Social History   Substance Use Topics   • Smoking status: Current Every Day Smoker     Packs/day: 1.00     Types: Cigarettes   • Smokeless tobacco: Former User   • Alcohol use Yes       "Comment: \"is it 2 hours for every 6 oz... then it never filters out\"       History   Drug Use No       FAMILY HISTORY  History reviewed. No pertinent family history.    CURRENT MEDICATIONS  Patient does not take any medications    ALLERGIES  No Known Allergies    PHYSICAL EXAM  VITAL SIGNS: BP (!) 92/69   Pulse 90   Resp 19   Ht 1.651 m (5' 5\")   Wt 99.8 kg (220 lb)   SpO2 92%   BMI 36.61 kg/m²   Vitals reviewed.  Consitutional: Well-developed, obese. Negative for: distress.  HENT: Mucous membranes dry, Normocephalic, right external ear normal, left external ear normal, oropharynx clear  Eyes: Strabismus, Conjunctivae normal, Negative for: discharge in right and left eye, icterus.  Neck: Range of motion normal, supple. Negative for cervical adenopathy.  Cardiovascular: Normal rate, regular rhythm, heart sounds normal, intact distal pulses. Negative for: murmur, rub, gallop.  Pulmonary/Chest Wall: Effort normal, breath sounds normal. Negative for: respiratory distress, wheezes, rales, rhonchi.   Abdominal: Tenderness throughout the entire abdomen, moderately distended, hyperactive bowel sounds, positive peritoneal signs.  Musculoskeletal: Normal range of motion. Negative for edema.  Neurological: Alert and oriented x3. No focal deficits.  Skin: Warm, dry. Negative for rash.  Psych: Mood/affect normal, behavior normal, judgment normal.    DIAGNOSTIC STUDIES / PROCEDURES    LABS  Results for orders placed or performed during the hospital encounter of 01/20/19   PROTHROMBIN TIME (INR)   Result Value Ref Range    PT 14.1 12.0 - 14.6 sec    INR 1.08 0.87 - 1.13   APTT   Result Value Ref Range    APTT 23.1 (L) 24.7 - 36.0 sec   COD (ADULT)   Result Value Ref Range    ABO Grouping Only AB     Rh Grouping Only POS     Antibody Screen-Cod POS    LACTIC ACID   Result Value Ref Range    Lactic Acid 1.1 0.5 - 2.0 mmol/L   EKG (NOW)   Result Value Ref Range    Report       [facility] Emergency " Dept.    Test Date:  2019  Pt Name:    CARLOS SOTO             Department: ER  MRN:        [id number]                      Room:       RD 07  Gender:     Female                       Technician: [id number]  :        1975                   Requested By:MAURICIO LITTLEJOHN  Order #:    [id number]                    Reading MD: MAURICIO LITTLEJOHN MD    Measurements  Intervals                                Axis  Rate:       83                           P:          50  AL:         156                          QRS:        10  QRSD:       88                           T:          73  QT:         420  QTc:        494    Interpretive Statements  SINUS RHYTHM at a rate of 83  BORDERLINE PROLONGED QT INTERVAL  T wave flattening in the inferior leads  No previous ECG available for comparison    Electronically Signed On 2019 18:26:42 PST by MAURICIO LITTLEJOHN MD       All labs reviewed by me.    EKG Interpretation:  Twelve-lead EKG by my interpretation is as above.  No ST or T wave changes to indicate ischemia or infarct    RADIOLOGY  OUTSIDE IMAGES-DX ABDOMEN   Final Result      OUTSIDE IMAGES-CT ABDOMEN /PELVIS   Final Result        The radiologist's interpretation of all radiological studies have been reviewed by me.    COURSE & MEDICAL DECISION MAKING  Nursing notes, VS, PMSFHx reviewed in chart.    Obtained and reviewed outside medical records from [place] which indicated: WBC 22, Hemoglobin 6, Platelets 516, Ca 8.1, Lactate 1.8, and all other labs were negative. Patients CT w/o contrasts showed free air in the left upper quadrant, possible proximal colon pneumatosis, prominent uterine fundus with 5x4 cm mass and left ovarian cyst.     4:43 PM Patient seen and examined at bedside. The patient presents with abdominal pain and the differential diagnosis includes but is not limited to bowel versus uterine perforation. Ordered Prothrombin Time (INR), APTT, COD (Adult), Lactic Acid, EKG. Patient will be treated with  Rocephin 2 g and NS infusion given her NPO status.      5:31 PM - Patient will be treated with Dilaudid 0.5 mg    5:33 PM - Paged Surgery    5:39 PM - I spoke with Dr. Gilmore, Surgery, who agrees to admit the patient for surgery.    6:02 PM - Informed the patient that she will be admitted to the hospital, and require surgery which will be performed by Dr. Gilmore. She understands and is comfortable with the plan of care.    HYDRATION: Based on the patient's presentation of Inability to take oral fluids the patient was given IV fluids. IV Hydration was used because oral hydration was not adequate alone. Upon recheck following hydration, the patient was showing signs of good hydration.    DISPOSITION:  Patient will be admitted to Dr. Gilmore, Surgery in guarded condition.    FINAL IMPRESSION  1. Perforated bowel (HCC)    2. Uterine mass    3. Cyst of left ovary          Joseph STANTON (Scribe), am scribing for, and in the presence of, Ileana Bailey M.D..    Electronically signed by: Joseph Harris (Scribe), 1/20/2019    IIleana M.D. personally performed the services described in this documentation, as scribed by Joseph Harris in my presence, and it is both accurate and complete. C.    The note accurately reflects work and decisions made by me.  Ileana Bailey  1/20/2019  6:32 PM

## 2019-01-21 NOTE — PROGRESS NOTES
Dex from Lab called with critical result of hemoglobin of 4.9 at 0425. Critical lab result read back to Dex.   Trauma LM Kelley notified of critical lab result at 0430.  Critical lab result read back by Trauma LM kelley.

## 2019-01-21 NOTE — PROGRESS NOTES
"/72   Pulse 87   Temp 37.4 °C (99.4 °F) (Oral)   Resp 18   Ht 1.651 m (5' 5\")   Wt 87.8 kg (193 lb 9 oz)   LMP 01/19/2019   SpO2 91%   Breastfeeding? No   BMI 32.21 kg/m²     Seen with Dr. Gilmore   Discussed with Nurse Lyric     Transfuse one additional PRBC for 2 U total    Follow up CBC 1600   "

## 2019-01-21 NOTE — H&P
HISTORY OF PRESENT ILLNESS:  The patient is a 43-year-old woman who was   transferred here for further care from an outside facility.  She describes   acute onset of abdominal pain this morning.  Prior to that, she had not noted   any change in her bowel habits, nausea and vomiting, or any changes in her   basic health.  The pain has been severe since that time and fluctuating in   intensity.  It is worse with palpation and movement.    PAST MEDICAL HISTORY:  Unremarkable.    PAST SURGICAL HISTORY:  .    MEDICATIONS:  Prior to admission none.    ALLERGIES:  She has no stated drug allergies.    SOCIAL HISTORY:  She does not drink or smoke.    FAMILY HISTORY:  Essentially negative.    REVIEW OF SYSTEMS:  Good health prior to the onset of abdominal pain this   morning.  Negative for AMA and CMS criteria.    PHYSICAL EXAMINATION:  VITAL SIGNS:  Here, she has a pulse of 91 and blood pressure 92/69.  GENERAL:  She is lying in bed and appears moderately uncomfortable.  HEENT:  Unremarkable.  Pupils are equal.  Oropharynx is without lesions.  NECK:  Supple.  LUNGS:  Clear.  CARDIOVASCULAR:  Reveals regular rate and rhythm.  ABDOMEN:  Tender.  She does have evidence of peritoneal irritation with fairly   significant tenderness on palpation.  EXTREMITIES:  Symmetrical, without clubbing, cyanosis or edema.  She has   palpable radial and femoral pulses.  NEUROLOGIC:  She is neurologically intact.  SKIN:  Warm and dry.    LABORATORY DATA:  Here includes lactic acid of 1.1.  INR is 1.08.  She   apparently had labs drawn at the outside facility, which are not available for   me to review.  She did have a CT scan at the outside facility, which does   demonstrate free air without any obvious source.    IMPRESSION:  This is a 43-year-old woman with onset of abdominal pain acutely   this morning.  She has peritonitis on exam and a CT scan done at an outside   facility, which shows free air.  Based on these findings, a  laparotomy is   indicated.  I discussed this in detail with her including the open approach,   the fact that a source for her free air is not readily apparent on the CT scan   and specifically that there was no evidence of diverticulitis on the CT scan.    She understands.  I discussed with her potential for bowel resection and the   potential for ostomies to include ileostomy or colostomy.  She does wish to   proceed.  We will make arrangements.       ____________________________________     MD LAURENT FRANCIS / ALISON    DD:  01/20/2019 19:47:47  DT:  01/20/2019 20:52:57    D#:  [id number]  Job#:  [id number]

## 2019-01-21 NOTE — PROGRESS NOTES
Report received from PACU RN.  Patient arrived to floor via gurney.     Educated on admission including: unit policies, welcome packet, white board, TV system, call light, call before fall, plan of care, how to report/escalate concerns, VS schedule, IS use, lab schedule, oral care expectations, ambulation expectations, and up to chair for all meals expectation if possible.    Call light and belongings within reach.  All questions answered.

## 2019-01-21 NOTE — ED NOTES
Med rec complete per pt at bedside   NKDA  Pt states she took an old RX of Keflex because she didn't feel good one week ago (Took for 3 days)

## 2019-01-21 NOTE — ASSESSMENT & PLAN NOTE
Hemoglobin at sending facility 6.3  1/21 hemoglobin down to 4.9   - Transfuse 2 units of PRBCs    - Follow up Hgb 6.8 - transfuse 1 unit RPBC  1/22 Hgb 7.7   - Iron per pharmacy protocol     1/24 Hemoglobin stable

## 2019-01-21 NOTE — ED TRIAGE NOTES
Arrived via [employer] flight transfer from [facility] access [facility]. Pt reportedly has a perforated colon with free air and no free fluid. An H&H of 6 and 22, Plat. 550, Lactic 1.8 Pt had refused transfusion at prior facility because she doesn't want them unless she is absolutely dying. Dilaudid 0.5mg x2, Toradol 30mg, Flagl 500mg, 500 NS bolus all given prior to arrival. Also reported a questionable mass near her uterus, and an ovarian cyst. Pt alert and oriented on arrival and norno-tensive

## 2019-01-21 NOTE — PROGRESS NOTES
Report received from day RN, assumed Care.   Patient is AOx4, responds appropriately.      Pain controlled at this time.  Patient is tolerating clear liquid diet, denies nausea/vomiting. + void, - flatus    Midline incision with island dressing. Dressing CDI.    Plan of care discussed, all questions answered.    Explained importance of calling before getting OOB and pt verbalizes understanding.    Call light and belongings within reach, treaded slipper socks on, SCD in use, bed in lowest locked position.  Hourly rounding in place, all needs met at this time

## 2019-01-21 NOTE — PROGRESS NOTES
2 RN skin check performed. Midline incisions with island dressing. No other remarkable skin findings.

## 2019-01-21 NOTE — OP REPORT
DATE OF SERVICE:  2019    SURGEON:  Reynaldo Gilmore MD    ASSISTANT:  Dr. Flowers.    PREOPERATIVE DIAGNOSIS:  Peritonitis with free air.    POSTOPERATIVE DIAGNOSIS:  Ovarian cyst adherent uterus, otherwise negative   laparotomy.    PROCEDURE PERFORMED:  Exploratory laparotomy with drainage of ovarian cyst.    ANESTHESIA:  General endotracheal anesthesia.    ANESTHESIOLOGIST:  Cholo Chan MD    INDICATIONS:  This is a 43-year-old woman who presented in transfer from an   outside facility with acute onset of lower abdominal pain after passing clots   through her vagina.  CT scan at the outside facility did reveal a small amount   of free air.  She is also noted to have a white count of 22,000.  She was   tender and it is felt that based on the above, laparotomy was indicated.    DESCRIPTION OF PROCEDURE:  Procedure discussed in detail with the patient   including the risk of bleeding, infection, abscess, and hematoma; also   discussed the potential for bowel resection and an ileostomy or colostomy.    She understood all the above and wished to proceed.  She was placed under   anesthesia by Dr. Chan.  Her abdomen was prepped and draped with   chlorhexidine prep and sterile drapes.  After a timeout, a midline incision   was made and through this incision, peritoneal cavity was entered.  Some   omental adhesions were noted and these were carefully taken down.  The patient   had a low midline from previous .  Her exploration did reveal that   her uterine was adherent to this low midline incision.  The peritoneal cavity   was then carefully inspected beginning with the small bowel.  The small bowel   was completely normal.  I then inspected the entire colon including the   appendix and it was completely normal as well.  There was absolutely no   evidence of inflammation.  The stomach was then inspected anteriorly and was   normal.  The lesser sac was entered by creating a space in the inferior   portion  of the stomach and the lesser sac was evaluated along with the   posterior stomach and it was completely normal.  Duodenal sweep and   gallbladder were inspected and they were normal.  There was essentially no   inflammation and no evidence of any perforation.  Exploration in the pelvis   did reveal a large left ovarian cyst as well as a firm adherent uterus.  I did   ask at this point for consultation.  Dr. Flowers kindly came in the room and   evaluated the patient.  He did drain the cyst.  He has requested followup for   the patient as based on her anatomy she may be a candidate for hysterectomy.    I did not feel comfortable proceeding with this given the lack of consent for   this type of procedure and he was in agreement.  I then carefully reinspected   the entire peritoneal cavity and found absolutely no abnormalities.  The   fascia was then approximated with looped PDS and the skin with staples.  The   patient tolerated the procedure well without apparent complication.  Final   counts were reported as correct.       ____________________________________     MD LAURENT FRANCIS / ALISON    DD:  01/20/2019 20:55:06  DT:  01/20/2019 21:37:42    D#:  [id number]  Job#:  [id number]    cc: Bacilio Flowers MD

## 2019-01-21 NOTE — OR SURGEON
Immediate Post OP Note    PreOp Diagnosis: perforated viscous    PostOp Diagnosis: large ovarian cyst, adherent uterus, o/w negative laparotomy    Procedure(s):  EXPLORATORY LAPAROTOMY - Wound Class: Clean Contaminated  CYST EXCISION - Wound Class: Clean    Surgeon(s):  DEBO Mcintosh M.D.    Anesthesiologist/Type of Anesthesia:  Anesthesiologist: Cholo Chan M.D./General    Surgical Staff:  Circulator: Franki Keith RPHILIPP; Elba Mcgrath R.N.  Scrub Person: Estelle De; Marta García    Specimens removed if any:  ID Type Source Tests Collected by Time Destination   1 : left ovarian cyst fluid Body Fluid Cyst AEROBIC/ANAEROBIC CULTURE (SURGERY) Reynaldo Gilmore M.D. 1/20/2019  8:42 PM        Estimated Blood Loss: 25 cc    Findings: large ovarian cyst, uterine adhesions    Complications: none        1/20/2019 8:52 PM Reynaldo Gilmore M.D.

## 2019-01-22 PROBLEM — N83.209 OVARIAN CYST: Status: ACTIVE | Noted: 2019-01-22

## 2019-01-22 PROBLEM — N92.0 MENORRHAGIA: Status: ACTIVE | Noted: 2019-01-22

## 2019-01-22 LAB
ANISOCYTOSIS BLD QL SMEAR: ABNORMAL
BASO STIPL BLD QL SMEAR: NORMAL
BASOPHILS # BLD AUTO: 0.3 % (ref 0–1.8)
BASOPHILS # BLD: 0.07 K/UL (ref 0–0.12)
COMMENT 1642: NORMAL
EOSINOPHIL # BLD AUTO: 0.38 K/UL (ref 0–0.51)
EOSINOPHIL NFR BLD: 1.8 % (ref 0–6.9)
ERYTHROCYTE [DISTWIDTH] IN BLOOD BY AUTOMATED COUNT: 57.4 FL (ref 35.9–50)
FERRITIN SERPL-MCNC: 114.6 NG/ML (ref 10–291)
HCT VFR BLD AUTO: 26.8 % (ref 37–47)
HGB BLD-MCNC: 7.7 G/DL (ref 12–16)
HGB RETIC QN AUTO: 17.2 PG/CELL (ref 29–35)
HYPOCHROMIA BLD QL SMEAR: ABNORMAL
IMM GRANULOCYTES # BLD AUTO: 0.23 K/UL (ref 0–0.11)
IMM GRANULOCYTES NFR BLD AUTO: 1.1 % (ref 0–0.9)
IMM RETICS NFR: 47.1 % (ref 9.3–17.4)
IRON SATN MFR SERPL: ABNORMAL % (ref 15–55)
IRON SERPL-MCNC: <10 UG/DL (ref 40–170)
LYMPHOCYTES # BLD AUTO: 1.5 K/UL (ref 1–4.8)
LYMPHOCYTES NFR BLD: 7.2 % (ref 22–41)
MCH RBC QN AUTO: 20.9 PG (ref 27–33)
MCHC RBC AUTO-ENTMCNC: 28.7 G/DL (ref 33.6–35)
MCV RBC AUTO: 72.8 FL (ref 81.4–97.8)
MICROCYTES BLD QL SMEAR: ABNORMAL
MONOCYTES # BLD AUTO: 1.24 K/UL (ref 0–0.85)
MONOCYTES NFR BLD AUTO: 6 % (ref 0–13.4)
MORPHOLOGY BLD-IMP: NORMAL
NEUTROPHILS # BLD AUTO: 17.38 K/UL (ref 2–7.15)
NEUTROPHILS NFR BLD: 83.6 % (ref 44–72)
NRBC # BLD AUTO: 0.03 K/UL
NRBC BLD-RTO: 0.1 /100 WBC
OVALOCYTES BLD QL SMEAR: NORMAL
PLATELET # BLD AUTO: 363 K/UL (ref 164–446)
PLATELET BLD QL SMEAR: NORMAL
PMV BLD AUTO: 9 FL (ref 9–12.9)
POLYCHROMASIA BLD QL SMEAR: NORMAL
RBC # BLD AUTO: 3.68 M/UL (ref 4.2–5.4)
RBC BLD AUTO: PRESENT
RETICS # AUTO: 0.05 M/UL (ref 0.04–0.06)
RETICS/RBC NFR: 1.5 % (ref 0.8–2.1)
TIBC SERPL-MCNC: 440 UG/DL (ref 250–450)
WBC # BLD AUTO: 20.8 K/UL (ref 4.8–10.8)

## 2019-01-22 PROCEDURE — 94667 MNPJ CHEST WALL 1ST: CPT

## 2019-01-22 PROCEDURE — 700111 HCHG RX REV CODE 636 W/ 250 OVERRIDE (IP): Performed by: NURSE PRACTITIONER

## 2019-01-22 PROCEDURE — 700111 HCHG RX REV CODE 636 W/ 250 OVERRIDE (IP): Performed by: SURGERY

## 2019-01-22 PROCEDURE — 85046 RETICYTE/HGB CONCENTRATE: CPT

## 2019-01-22 PROCEDURE — 36415 COLL VENOUS BLD VENIPUNCTURE: CPT

## 2019-01-22 PROCEDURE — 83550 IRON BINDING TEST: CPT

## 2019-01-22 PROCEDURE — 94668 MNPJ CHEST WALL SBSQ: CPT

## 2019-01-22 PROCEDURE — 770006 HCHG ROOM/CARE - MED/SURG/GYN SEMI*

## 2019-01-22 PROCEDURE — 83540 ASSAY OF IRON: CPT

## 2019-01-22 PROCEDURE — 82728 ASSAY OF FERRITIN: CPT

## 2019-01-22 PROCEDURE — 700102 HCHG RX REV CODE 250 W/ 637 OVERRIDE(OP): Performed by: SURGERY

## 2019-01-22 PROCEDURE — 99232 SBSQ HOSP IP/OBS MODERATE 35: CPT | Mod: GC | Performed by: OBSTETRICS & GYNECOLOGY

## 2019-01-22 PROCEDURE — 85025 COMPLETE CBC W/AUTO DIFF WBC: CPT

## 2019-01-22 PROCEDURE — A9270 NON-COVERED ITEM OR SERVICE: HCPCS | Performed by: SURGERY

## 2019-01-22 PROCEDURE — 700105 HCHG RX REV CODE 258: Performed by: SURGERY

## 2019-01-22 PROCEDURE — 94669 MECHANICAL CHEST WALL OSCILL: CPT

## 2019-01-22 RX ORDER — ACETAMINOPHEN 325 MG/1
650 TABLET ORAL ONCE
Status: COMPLETED | OUTPATIENT
Start: 2019-01-22 | End: 2019-01-22

## 2019-01-22 RX ORDER — DIPHENHYDRAMINE HCL 25 MG
25 TABLET ORAL ONCE
Status: COMPLETED | OUTPATIENT
Start: 2019-01-22 | End: 2019-01-22

## 2019-01-22 RX ORDER — DIPHENHYDRAMINE HYDROCHLORIDE 50 MG/ML
25 INJECTION INTRAMUSCULAR; INTRAVENOUS ONCE
Status: COMPLETED | OUTPATIENT
Start: 2019-01-22 | End: 2019-01-22

## 2019-01-22 RX ADMIN — ACETAMINOPHEN 650 MG: 325 TABLET, FILM COATED ORAL at 11:06

## 2019-01-22 RX ADMIN — KETOROLAC TROMETHAMINE 30 MG: 30 INJECTION, SOLUTION INTRAMUSCULAR; INTRAVENOUS at 12:44

## 2019-01-22 RX ADMIN — DIPHENHYDRAMINE HCL 25 MG: 25 TABLET ORAL at 11:06

## 2019-01-22 RX ADMIN — OXYCODONE HYDROCHLORIDE 10 MG: 10 TABLET ORAL at 08:46

## 2019-01-22 RX ADMIN — ENOXAPARIN SODIUM 40 MG: 100 INJECTION SUBCUTANEOUS at 09:32

## 2019-01-22 RX ADMIN — IRON DEXTRAN 1575 MG: 50 INJECTION INTRAMUSCULAR; INTRAVENOUS at 15:31

## 2019-01-22 RX ADMIN — OXYCODONE HYDROCHLORIDE 5 MG: 5 TABLET ORAL at 19:03

## 2019-01-22 RX ADMIN — KETOROLAC TROMETHAMINE 30 MG: 30 INJECTION, SOLUTION INTRAMUSCULAR; INTRAVENOUS at 05:13

## 2019-01-22 RX ADMIN — IRON DEXTRAN 25 MG: 50 INJECTION INTRAMUSCULAR; INTRAVENOUS at 11:33

## 2019-01-22 RX ADMIN — ACETAMINOPHEN 1000 MG: 500 TABLET ORAL at 05:13

## 2019-01-22 RX ADMIN — KETOROLAC TROMETHAMINE 30 MG: 30 INJECTION, SOLUTION INTRAMUSCULAR; INTRAVENOUS at 22:56

## 2019-01-22 RX ADMIN — ACETAMINOPHEN 1000 MG: 500 TABLET ORAL at 22:56

## 2019-01-22 RX ADMIN — KETOROLAC TROMETHAMINE 30 MG: 30 INJECTION, SOLUTION INTRAMUSCULAR; INTRAVENOUS at 17:01

## 2019-01-22 ASSESSMENT — ENCOUNTER SYMPTOMS
NEUROLOGICAL NEGATIVE: 1
ROS GI COMMENTS: BM 1/21
MYALGIAS: 1
PSYCHIATRIC NEGATIVE: 1
ABDOMINAL PAIN: 1

## 2019-01-22 ASSESSMENT — PAIN SCALES - GENERAL
PAINLEVEL_OUTOF10: 6
PAINLEVEL_OUTOF10: 4
PAINLEVEL_OUTOF10: 6

## 2019-01-22 NOTE — CONSULTS
DATE OF SERVICE:  2019    INTRAOPERATIVE CONSULTATION    REASON FOR CONSULT:  Pelvic mass, adhesions and heavy vaginal bleeding.    HISTORY OF PRESENT ILLNESS:  This is a 43-year-old who was admitted to [facility]   after transfer from an outside facility due to free air in her abdomen and a   white count of over 22,000.  She was then taken to the operating room by   general surgery (Dr. Reynaldo Gilmore) for evaluation of this free air for possible perforation of a   hollow viscus.  During that procedure, the surgeon noticed some abnormalities   within the pelvis and I was called down intraoperatively to evaluate.  I then   did scrub into the surgery at this time.  Per general surgery report, patient   does have a history of heavy vaginal bleeding during her menses.  Hemoglobin   was slightly above 6 at the time of presentation.  I was unable to obtain any   other history as the patient was intubated and in the operating room at this   time.  Upon discussion with the surgeon, he reported to me that there was no   any etiology for the free air within the abdomen.  He did notice an   abnormality on the left ovary and on the uterus and he asked me to evaluate it   at this time.  Evaluation of the pelvis showed an extremely adherent anterior   aspect of the uterus to the anterior abdominal wall.  These were very thick   adhesions likely resulting from her midline vertical incision for her   .  Right ovary was free of any abnormalities.  Left ovary showed   approximately a 4 cm simple cyst.  There were some adhesions between the left   ovary and the bowel and abdominal wall.  To get a better view of this ovary, I   did carefully dissect the bowel and abdominal wall away.  The ovary was then   freed and completely mobile with no evidence of any excrescences or solid   masses within it.  The cyst appeared simple in appearance.  As the patient was   not consented for any type of gynecological procedure or pelvic  procedure at   this time, I was unable to proceed with any other further surgery.  I did,   however, drain that ovary as the surgeon did report to me that the patient was   complaining of some left lower quadrant pain and it may explain her pain and   her presentation to [facility].  This yielded just yellowish serous fluid.  No   evidence of any bleeding.  I then scrubbed out at this time after making sure   that the ovary was not bleeding following the drainage of the cyst.    RECOMMENDATIONS:  At this time are for transfusion if patient does require   them due to her what appears to be menorrhagia.  She will require a full   evaluation with GYN surgery.  At this time, she would likely need endometrial   biopsy to assess for any other causes of her bleeding, which may be malignant   in origin.  We will likely need an ultrasound of the pelvis as well.  I did   not palpate any fibroids at this time.  She does not have any precancerous   lesions or any contraindications to hormonal therapy.  She could receive   Depo-Provera injections, possibly a Mirena IUD to control her cycles.  Due to   the extensive and very thick adhesions from the uterus to the anterior   abdominal wall, her surgery would be very technically challenging with an   increased risk of injury.  The patient will be asked to follow up with [facility] as soon as she is able to leave the hospital.       ____________________________________     MD BENNETT Gaytan / ALISON    DD:  01/21/2019 18:28:56  DT:  01/21/2019 19:06:53    D#:  [id number]  Job#:  [id number]

## 2019-01-22 NOTE — PROGRESS NOTES
Assumed care of patient from night shift RN  43 year old female  Full code  NKA  Admitted 1/20 d/t abd pain  Pain 4/10 denying pain medication at this time, patient repositioned  A&O x 4  3 L nc  Cardiac: tachy  LBM 1/21  Regular diet  Voiding in bedside commode  PIV: 20 g L AC with LR at 100  Skin: Midline abd incision with island dressing and old drainage  No fall risk per justen kate  Plan: wean O2  All questions answered and all needs met at this time. Bed in low, locked position. Call light within reach. RN will implement hourly rounding and answer call lights appropriately.

## 2019-01-22 NOTE — PROGRESS NOTES
Trauma / Surgical Daily Progress Note    Date of Service  1/22/2019    Chief Complaint  43 y.o. female admitted 1/20/2019 with Acute abdomen and anemia - rule out perforated viscus  POD # 2 - Exploratory lap with ovarian cyst excision - no perforated viscus     Interval Events  Total 3 u PRBC - follow up Hgb 7.7  Iron per pharmacy protocol  Weaning oxygen  Poor  cc - PEP added - educated patient  Advance diet  Lovenox resumed  Labs as clinically indicated   Gyn recommendations reviewed     Plan  Anticipate home in next 24-48 hours pending progress  Would like to follow up in [place] with gyn     Review of Systems  Review of Systems   Constitutional: Positive for malaise/fatigue.   HENT: Negative.    Respiratory:        Oxygen 3L NC   Gastrointestinal: Positive for abdominal pain.        BM 1/21   Genitourinary:        Voiding    Musculoskeletal: Positive for myalgias.   Neurological: Negative.    Psychiatric/Behavioral: Negative.    All other systems reviewed and are negative.       Vital Signs  Temp:  [36.1 °C (97 °F)-37.2 °C (99 °F)] 36.8 °C (98.2 °F)  Pulse:  [] 98  Resp:  [17-20] 17  BP: (125-157)/(63-99) 144/82  SpO2:  [91 %-95 %] 94 %    Physical Exam  Physical Exam   Constitutional: She is oriented to person, place, and time. She appears well-developed. No distress.   Pulmonary/Chest: Effort normal. No respiratory distress.   Diminished bibasilar    Abdominal:   Soft   Non distended  Expected tenderness with palpation to midline incision    Musculoskeletal: Normal range of motion.   Neurological: She is alert and oriented to person, place, and time.   Skin: Skin is warm and dry.   Psychiatric: She has a normal mood and affect.   Nursing note and vitals reviewed.      Laboratory  Recent Results (from the past 24 hour(s))   CBC WITH DIFFERENTIAL    Collection Time: 01/21/19  4:01 PM   Result Value Ref Range    WBC 20.0 (H) 4.8 - 10.8 K/uL    RBC 3.38 (L) 4.20 - 5.40 M/uL    Hemoglobin 6.8 (L)  12.0 - 16.0 g/dL    Hematocrit 24.0 (L) 37.0 - 47.0 %    MCV 71.0 (L) 81.4 - 97.8 fL    MCH 20.1 (L) 27.0 - 33.0 pg    MCHC 28.3 (L) 33.6 - 35.0 g/dL    RDW 54.8 (H) 35.9 - 50.0 fL    Platelet Count 342 164 - 446 K/uL    MPV 8.7 (L) 9.0 - 12.9 fL    Neutrophils-Polys 85.60 (H) 44.00 - 72.00 %    Lymphocytes 5.70 (L) 22.00 - 41.00 %    Monocytes 6.30 0.00 - 13.40 %    Eosinophils 1.20 0.00 - 6.90 %    Basophils 0.30 0.00 - 1.80 %    Immature Granulocytes 0.90 0.00 - 0.90 %    Nucleated RBC 0.20 /100 WBC    Neutrophils (Absolute) 17.11 (H) 2.00 - 7.15 K/uL    Lymphs (Absolute) 1.14 1.00 - 4.80 K/uL    Monos (Absolute) 1.26 (H) 0.00 - 0.85 K/uL    Eos (Absolute) 0.23 0.00 - 0.51 K/uL    Baso (Absolute) 0.05 0.00 - 0.12 K/uL    Immature Granulocytes (abs) 0.17 (H) 0.00 - 0.11 K/uL    NRBC (Absolute) 0.03 K/uL   CBC WITH DIFFERENTIAL    Collection Time: 01/22/19  4:31 AM   Result Value Ref Range    WBC 20.8 (H) 4.8 - 10.8 K/uL    RBC 3.68 (L) 4.20 - 5.40 M/uL    Hemoglobin 7.7 (L) 12.0 - 16.0 g/dL    Hematocrit 26.8 (L) 37.0 - 47.0 %    MCV 72.8 (L) 81.4 - 97.8 fL    MCH 20.9 (L) 27.0 - 33.0 pg    MCHC 28.7 (L) 33.6 - 35.0 g/dL    RDW 57.4 (H) 35.9 - 50.0 fL    Platelet Count 363 164 - 446 K/uL    MPV 9.0 9.0 - 12.9 fL    Nucleated RBC 0.10 /100 WBC    NRBC (Absolute) 0.03 K/uL    Neutrophils-Polys 83.60 (H) 44.00 - 72.00 %    Lymphocytes 7.20 (L) 22.00 - 41.00 %    Monocytes 6.00 0.00 - 13.40 %    Eosinophils 1.80 0.00 - 6.90 %    Basophils 0.30 0.00 - 1.80 %    Immature Granulocytes 1.10 (H) 0.00 - 0.90 %    Lymphs (Absolute) 1.50 1.00 - 4.80 K/uL    Monos (Absolute) 1.24 (H) 0.00 - 0.85 K/uL    Eos (Absolute) 0.38 0.00 - 0.51 K/uL    Baso (Absolute) 0.07 0.00 - 0.12 K/uL    Immature Granulocytes (abs) 0.23 (H) 0.00 - 0.11 K/uL    Neutrophils (Absolute) 17.38 (H) 2.00 - 7.15 K/uL    Hypochromia 2+     Anisocytosis 2+     Microcytosis 2+    PERIPHERAL SMEAR REVIEW    Collection Time: 01/22/19  4:31 AM   Result Value Ref  Range    Peripheral Smear Review see below    PLATELET ESTIMATE    Collection Time: 01/22/19  4:31 AM   Result Value Ref Range    Plt Estimation Normal    MORPHOLOGY    Collection Time: 01/22/19  4:31 AM   Result Value Ref Range    RBC Morphology Present     Polychromia 2+     Ovalocytes 1+     Basophilic Stippling Few    DIFFERENTIAL COMMENT    Collection Time: 01/22/19  4:31 AM   Result Value Ref Range    Comments-Diff see below    RETICULOCYTES COUNT    Collection Time: 01/22/19  8:41 AM   Result Value Ref Range    Reticulocyte Count 1.5 0.8 - 2.1 %    Retic, Absolute 0.05 0.04 - 0.06 M/uL    Imm. Reticulocyte Fraction 47.1 (H) 9.3 - 17.4 %    Retic Hgb Equivalent 17.2 (L) 29.0 - 35.0 pg/cell   IRON/TOTAL IRON BIND    Collection Time: 01/22/19  8:41 AM   Result Value Ref Range    Iron <10 (L) 40 - 170 ug/dL    Total Iron Binding 440 250 - 450 ug/dL    % Saturation see below 15 - 55 %   FERRITIN    Collection Time: 01/22/19  8:42 AM   Result Value Ref Range    Ferritin 114.6 10.0 - 291.0 ng/mL       Fluids    Intake/Output Summary (Last 24 hours) at 01/22/19 1045  Last data filed at 01/22/19 0915   Gross per 24 hour   Intake          2911.83 ml   Output             2750 ml   Net           161.83 ml       Core Measures & Quality Metrics  Labs reviewed  Hathaway catheter: No Hathaway      DVT Prophylaxis: Enoxaparin (Lovenox)            AZEB Score  ETOH Screening    Assessment/Plan  Anemia- (present on admission)   Assessment & Plan    Hemoglobin at sending facility 6.3  1/21 hemoglobin down to 4.9   - Transfuse 2 units of PRBCs    - Follow up Hgb 6.8 - transfuse 1 unit RPBC  1/22 Hgb 7.7   - Iron per pharmacy protocol      Acute abdomen- (present on admission)   Assessment & Plan    Peritonitis on exam. CT scan done at an outside facility, demonstrated free air.  OR for exploratory laparotomy with ovarian cyst excision  Reynaldo Gilmore MD Surgery     Menorrhagia- (present on admission)   Assessment & Plan     Premorbid  Definitive plan pending  May need inpatient work up if able to tolerate - otherwise follow up outpatient in [place]   Zarina To MD, OB/GYN     Ovarian cyst- (present on admission)   Assessment & Plan    Drained intraoperatively  Zarina To MD, OB/GYN     Discussed patient condition with RN, Patient and trauma surgery. Dr. Gilmore

## 2019-01-22 NOTE — CONSULTS
GYN Consult    CC: ovarian cyst intraop consult, also with hx of heavy menses and anemia    HPI: Tad Kolb is a 43 y.o.  transferred from outside hospital for abdominal pain CT scan concerning for free air.  Patient is seeking laparotomy by general surgery.  GYN was consulted intraoperatively for ovarian cyst.  Intraoperative findings also significant for dense adhesions of the uterus to the anterior abdominal wall with prior history of vertical incision at time of .  Called by general surgery today is patient with postop anemia requiring transfusion.  Significant history of very heavy menses.    Patient reports she is doing well with some abdominal pain since her surgery.  Reports the pain is helped by pain medications.  Patient is visiting [place] from [place] currently.  Would like to return to [place] as soon as possible peer reports she had sudden onset of pain with trying to go to the hospital, pain is midline and lower abdominal.  Her family members with her report that she has been having some pain on and off in her lower abdomen.  Endorses that she has had painful periods with significant lower abdominal cramping for some time.  Also reports although her periods start on a monthly basis they last anywhere from 1-3 weeks and can be heavy at times.  Reports large clots and soaking through pads.  Reports she had significant vaginal bleeding within the last year and was seen at [facility] in [place] where she had a transvaginal ultrasound.  Patient thinks she may have had a Pap smear during that stay but is not sure.  Reports there is no endometrial biopsy or D&C performed.  Has not seen a gynecologist as an outpatient in many years.  Is not on contraception, is sexually active.  Denies discharge.    Reports light vaginal bleeding today, reports this is the end of her current menstrual cycle which she started little over a week ago.  Used 2 pads  today, no clots, not soaked.    ROS:  constitutional: denies fevers, general concerns  CV: denies CP  Resp: some shortness of breath, cough  GI: denies abd pain, has had BM postop.  Tolerating clears without N/V  : + vaginal bleeding as above, denies discharge, + crampy pain primarily with menses, sometimes at other times, denies urinary complaints      OBHx:      x2, FT and denies complications  TAB x1    GYN Hx:   Reports menses start monthly but last 1-3wks as above  Denies hx of STIs  Denies hx of abnl paps, last pap unkown     PMHx: denies    PSHx: c/s x2; ex-lap here as above    Medications:   Current Facility-Administered Medications Ordered in Epic   Medication Dose Route Frequency Provider Last Rate Last Dose   • SODIUM CHLORIDE 0.9 % IV SOLN            • diphenhydrAMINE (BENADRYL) injection 12.5 mg  12.5 mg Intravenous Q15 MIN PRN Cholo Chan M.D.       • albuterol (PROVENTIL) 2.5mg/0.5ml nebulizer solution 2.5 mg  2.5 mg Inhalation Q10 MIN PRN Cholo Chan M.D.       • Respiratory Care per Protocol   Nebulization Continuous RT Reynaldo Gilmore M.D.       • Pharmacy Consult Request ...Pain Management Review 1 Each  1 Each Other PHARMACY TO DOSE Reynaldo Gilmore M.D.       • LR infusion   Intravenous Continuous Reynaldo Gilmore M.D. 100 mL/hr at 19 1111     • acetaminophen (TYLENOL) tablet 1,000 mg  1,000 mg Oral Q6HRS Reynaldo Gilmore M.D.   1,000 mg at 19 1835   • ketorolac (TORADOL) injection 30 mg  30 mg Intravenous Q6HRS Reynaldo Gilmore M.D.   30 mg at 19 1835   • oxyCODONE immediate-release (ROXICODONE) tablet 5 mg  5 mg Oral Q3HRS PRN Reynaldo Gilmore M.D.   5 mg at 19 1437   • oxyCODONE immediate release (ROXICODONE) tablet 10 mg  10 mg Oral Q3HRS PRN Reynaldo Gilmore M.D.   10 mg at 19 0314   • ondansetron (ZOFRAN) syringe/vial injection 4 mg  4 mg Intravenous Q4HRS PRN Reynaldo Gilmore M.D.         No current [facility]-ordered outpatient prescriptions on file.  "      Allergies: Patient has no known allergies.    Social History     Social History   • Marital status: Single     Spouse name: N/A   • Number of children: N/A   • Years of education: N/A     Social History Main Topics   • Smoking status: Current Every Day Smoker     Packs/day: 1.00     Types: Cigarettes   • Smokeless tobacco: Former User   • Alcohol use Yes      Comment: \"is it 2 hours for every 6 oz... then it never filters out\"    • Drug use: No   • Sexual activity: Not on file         FamHx: reports likely ovarian ca in mother (reports mother was taken for surgery to get her gallbladder taken out and had incidental finding of a stage IV GYN cancer which she  from), breast ca in maternal aunt in 40ss      Physical Exam:  /87   Pulse (!) 109   Temp 36.8 °C (98.2 °F) (Temporal)   Resp 18   Ht 1.651 m (5' 5\")   Wt 87.8 kg (193 lb 9 oz)   LMP 2019   SpO2 91%   Breastfeeding? No   BMI 32.21 kg/m²   gen: AAO, NAD, affect appropriate  CV: Regular, mildly tachycardic   resp: ctab  Abd: soft, ND, appropriately tender to palpation, incision w/ bandage in place - dried blood marked on bandage, +BS  : NEFG, trace blood on pad  Skin: warm/dry, no lesions  Ext: NT, no edema, SCDs in place    Results for ELAINA SOTO (MRN [id number]) as of 2019 19:32   Ref. Range 2019 04:09 2019 16:01   Hemoglobin Latest Ref Range: 12.0 - 16.0 g/dL 4.9 (LL) 6.8 (L)   Hematocrit Latest Ref Range: 37.0 - 47.0 % 18.7 (L) 24.0 (L)   preop Hgb at outside facility 6.3    A/P: 43 y.o.  POD 1 s/p ex-lap for abdominal pain, possible free pain with incidental ovarian cyst drained and hx of menorrhagia  - menorrhagia: pt unable to tolerate GYN exam in the bed right now given postop pain.  Reports she has had transvaginal ultrasound within the last year at outside facility.  RN asked to have patient sign record release to obtain these records.  Needs Pap smear, as well as endometrial biopsy.  Anticipate " will not be able to obtain needs inpatient.  Patient also has strong desire to return to [place] as soon as possible.  Discussed with her and family that she will need to establish care with gynecology as soon as possible when she gets home.    - would like to do full GYN exam if able, to rule out gross genital tract lesions, once able to tolerate during exam or asap after discharge with us or GYN provider at home.    -Patient reports she is finishing her period, no heavy vaginal bleeding here.  Anticipate most of her blood loss was related to her surgical procedure as well as hemodilution with IV fluid although her decreasing vaginal bleeding could have contributed to her drop in hemoglobin.  Transfused appropriately as per primary team.  Per RN plan for 1 additional unit.    -Not acutely bleeding but if desired to start something to decrease her chance return of heavy vaginal bleeding prior to appropriate outpatient GYN workup, would recommend progesterone therapy (micoronor vs daily provera vs DMPA).  Patient is not a candidate for estrogen as she is a current every day tobacco smoker.  Will discuss further with patient tomorrow options for progesterone therapy, hopefully once have imaging available for review.    Family history of breast and ovarian cancer concerning for possible hereditary genetic abnormality increasing risk for breast/ovarian CA.  Not known at time of surgical consult.  Ovarian cyst drained and fluid send to pathology, will f/u on this pathology, no other obvious GYN pathology found intraop other than dense adhesive disease.  Discussed with patient that her lower abdominal pain may be secondary to her uterus (as well as adhesive disease) but need imaging and full GYN exam/EMB/pap     I will see pt tomorrow; please call with urgent issues overnight ([phone])      Zarina To MD  [facility], Women's Health

## 2019-01-22 NOTE — CARE PLAN
Problem: Knowledge Deficit  Goal: Knowledge of disease process/condition, treatment plan, diagnostic tests, and medications will improve  Patient educated on blood transfusion. Patient verbalized understanding.    Problem: Respiratory:  Goal: Respiratory status will improve  Patient encouraged to deep breath to maintain spo2 of 90% and above. Patient verbalized understanding.

## 2019-01-22 NOTE — PROGRESS NOTES
LM Hodge called with new CBC results.   Hemoglobin at 6.8,  Orders received to transfuse one unit of red blood cells and for AM CBC.

## 2019-01-22 NOTE — CONSULTS
[facility] LABOR AND DELIVERY TRIAGE PROGRESS NOTE    PATIENT ID:  NAME:  Tad Kolb  MRN:               [id number]  YOB: 1975    ID: 43 y.o.  POD 1 s/p ex-lap for abdominal pain, possible free pain with incidental ovarian cyst drained and hx of menorrhagia    Subjective: Pt feeling better today. Did pass one clot from the vagina this morning. Has been able to ambulate. Passing flatus and stool x1. Tolerating regular diet.     ROS: Patient denies any fever chills, nausea, vomiting, headache, chest pain, shortness of breath, or dysuria or unusual swelling of hands or feet.     Objective:    Vitals:    19 2350 19 0338 19 0746 19 1314   BP: 130/80 157/99 144/82    Pulse: 92 98 98 92   Resp:    Temp: 36.6 °C (97.8 °F) 37.2 °C (99 °F) 36.8 °C (98.2 °F)    TempSrc: Temporal Temporal Temporal    SpO2: 94% 92% 94% 94%   Weight:       Height:         Temp (24hrs), Av.8 °C (98.2 °F), Min:36.6 °C (97.8 °F), Max:37.2 °C (99 °F)    Physical Exam:  General: No acute distress, resting comfortably in bed.  HEENT: normocephalic, nontraumatic, PERRLA, EOMI, on 3L NC  Cardiovascular: Heart RRR with no murmurs, rubs or gallops. Distal Pulses 2+  Respiratory: symmetric chest expansion, lungs CTAB, with no wheezes, rales, rhonci  Abdomen: gravid, nontender  : some small clots seen in vaginal vault, no cervical or vaginal lesions identified, no obvious lacerations or signs of trauma  Musculoskeletal: strength 5/5 in four extremities  Neuro: non focal with no numbness, tingling or changes in sensation    Assessment: 43 y.o.  POD 2 s/p ex-lap for abdominal pain, possible free pain with incidental ovarian cyst drained and hx of menorrhagia.    # Menorrhagia  # Anemia  # Incidental ovarian cyst  # S/p POD2 ex lap    Plan:   1. Pending records release from [place], spoke to nurse Pricila who will be assisting in following up these record . Per patient transvaginal US  performed w/in last year at OSH and possibly a Pap smear  2. Discussed with patient need for follow up w/ GYN either here or in [place]. She will need a pap smear and an endometrial biopsy for menorrhagia  3. F/up pathology from ovarian cyst  4. Rec progesterone therapy for menorrhagia. Ex: Micronor v daily provera v DMPA      Discussed case with Dr. To, TPN Attending. Case was discussed and attending agreed with plan prior to discharge of patient.    Margarita Ribera MD, PGY-1  [facility] [employer]  Family  Medicine Residency    GYN Attending Addendum:    I have seen Ms. Tad Kolb and agree w/ documentation by Dr. Ribera.   exam by me as documented, no gross abnormalities as expected, mild old blood in vault, no active bleeding.   Not able to get good bimanual exam as pt with abdominal pain from incision.        CT reviewed from [facility], ovarian cyst seen and drained intraoperatively by Dr. Flowers.  To f/u histology. ? Uterine mass (most likely fibroid) Record request sent today to review prior US from [facility].  Advised pt strongly that she needs to see GYN asap upon return home for pap smear and endometrial biopsy discussed need to r/o endometrial cancer.  Recommend menstrual suppression in the short term with progesterone, discussed option of PO vs IM and pt prefers PO.  Will start micronor.  Discussed with Dr. Gilmore, order placed to start.     Please call GYN with further urgent concerns.  If unable to obtain US from [facility], would consider TVUS to evaluate uterus.        Zarina To MD  [facility], Women's Health

## 2019-01-22 NOTE — ASSESSMENT & PLAN NOTE
Premorbid  Will need pap smear and an endometrial biopsy for menorrhagia, may complete outpatient  Menstrual suppression in the short term with progesterone, norethindrone started 1/22.  1/24 Pelvic ultrasound completed with a probable uterine fundal fibroid which does not affect the endometrial complex.  An incidental 2.6 cm septated left ovarian cyst, possibly resolving hemorrhagic cyst  Zarina To MD, OB/GYN

## 2019-01-22 NOTE — PROGRESS NOTES
IV Iron Per Pharmacy Note  Patient Lean Body Weight:  57 kg (ideal body weight)  Reason for Iron Replacement: acute blood loss anemia    Lab Results   Component Value Date/Time    WBC 20.8 (H) 01/22/2019 04:31 AM    RBC 3.68 (L) 01/22/2019 04:31 AM    HEMOGLOBIN 7.7 (L) 01/22/2019 04:31 AM    HEMATOCRIT 26.8 (L) 01/22/2019 04:31 AM    MCV 72.8 (L) 01/22/2019 04:31 AM    MCH 20.9 (L) 01/22/2019 04:31 AM    MCHC 28.7 (L) 01/22/2019 04:31 AM    MPV 9.0 01/22/2019 04:31 AM     Recent Labs      01/22/19   0841  01/22/19   0842   FERRITIN   --   114.6   IRON  <10*   --       Recent Labs      01/21/19   0259   CREATININE  0.78     Estimated Creatinine Clearance: 99.6 mL/min (by C-G formula based on SCr of 0.78 mg/dL).    Assessment/Plan:  1. IV Iron Indicated.   2. Give Iron Dextran 25 mg IV test dose following diphenhydramine/acetaminophen premeds over 30 minutes per protocol.  3. If no reaction (Anaphylaxis, Hypotension/Hypertension, N/V/D, Chest pain/Back Pain, Urticaria/Pruritis) in the next hour, proceed to full dose. Nursing to call the pharmacy IV room at ext. [phone] for full dose.  4. Full dose: Iron Dextran 1575 mg IV over 4 hours. Continue to monitor for delayed ADR including: Arthralgia/myalgia, Headache/backache, chills/dizziness/malaise, moderate to high fever and n/v.      Óscar Naidu, PharmD

## 2019-01-23 ENCOUNTER — APPOINTMENT (OUTPATIENT)
Dept: RADIOLOGY | Facility: MEDICAL CENTER | Age: 44
DRG: 981 | End: 2019-01-23
Attending: NURSE PRACTITIONER
Payer: COMMERCIAL

## 2019-01-23 LAB
BACTERIA FLD AEROBE CULT: NORMAL
BASOPHILS # BLD AUTO: 0.4 % (ref 0–1.8)
BASOPHILS # BLD: 0.07 K/UL (ref 0–0.12)
EOSINOPHIL # BLD AUTO: 0.45 K/UL (ref 0–0.51)
EOSINOPHIL NFR BLD: 2.4 % (ref 0–6.9)
ERYTHROCYTE [DISTWIDTH] IN BLOOD BY AUTOMATED COUNT: 62.4 FL (ref 35.9–50)
GRAM STN SPEC: NORMAL
HCT VFR BLD AUTO: 29.2 % (ref 37–47)
HGB BLD-MCNC: 8.1 G/DL (ref 12–16)
IMM GRANULOCYTES # BLD AUTO: 0.15 K/UL (ref 0–0.11)
IMM GRANULOCYTES NFR BLD AUTO: 0.8 % (ref 0–0.9)
LYMPHOCYTES # BLD AUTO: 1.85 K/UL (ref 1–4.8)
LYMPHOCYTES NFR BLD: 9.9 % (ref 22–41)
MCH RBC QN AUTO: 20.9 PG (ref 27–33)
MCHC RBC AUTO-ENTMCNC: 27.7 G/DL (ref 33.6–35)
MCV RBC AUTO: 75.3 FL (ref 81.4–97.8)
MONOCYTES # BLD AUTO: 1.61 K/UL (ref 0–0.85)
MONOCYTES NFR BLD AUTO: 8.6 % (ref 0–13.4)
NEUTROPHILS # BLD AUTO: 14.63 K/UL (ref 2–7.15)
NEUTROPHILS NFR BLD: 77.9 % (ref 44–72)
NRBC # BLD AUTO: 0.07 K/UL
NRBC BLD-RTO: 0.4 /100 WBC
PLATELET # BLD AUTO: 379 K/UL (ref 164–446)
PMV BLD AUTO: 9 FL (ref 9–12.9)
RBC # BLD AUTO: 3.88 M/UL (ref 4.2–5.4)
SIGNIFICANT IND 70042: NORMAL
SITE SITE: NORMAL
SOURCE SOURCE: NORMAL
WBC # BLD AUTO: 18.8 K/UL (ref 4.8–10.8)

## 2019-01-23 PROCEDURE — 85025 COMPLETE CBC W/AUTO DIFF WBC: CPT

## 2019-01-23 PROCEDURE — 71045 X-RAY EXAM CHEST 1 VIEW: CPT

## 2019-01-23 PROCEDURE — 36415 COLL VENOUS BLD VENIPUNCTURE: CPT

## 2019-01-23 PROCEDURE — 700102 HCHG RX REV CODE 250 W/ 637 OVERRIDE(OP): Performed by: OBSTETRICS & GYNECOLOGY

## 2019-01-23 PROCEDURE — 94669 MECHANICAL CHEST WALL OSCILL: CPT

## 2019-01-23 PROCEDURE — A9270 NON-COVERED ITEM OR SERVICE: HCPCS | Performed by: NURSE PRACTITIONER

## 2019-01-23 PROCEDURE — 700111 HCHG RX REV CODE 636 W/ 250 OVERRIDE (IP): Performed by: NURSE PRACTITIONER

## 2019-01-23 PROCEDURE — 700111 HCHG RX REV CODE 636 W/ 250 OVERRIDE (IP): Performed by: SURGERY

## 2019-01-23 PROCEDURE — A9270 NON-COVERED ITEM OR SERVICE: HCPCS | Performed by: OBSTETRICS & GYNECOLOGY

## 2019-01-23 PROCEDURE — 770006 HCHG ROOM/CARE - MED/SURG/GYN SEMI*

## 2019-01-23 PROCEDURE — 700102 HCHG RX REV CODE 250 W/ 637 OVERRIDE(OP): Performed by: NURSE PRACTITIONER

## 2019-01-23 PROCEDURE — 88305 TISSUE EXAM BY PATHOLOGIST: CPT

## 2019-01-23 PROCEDURE — 94668 MNPJ CHEST WALL SBSQ: CPT

## 2019-01-23 PROCEDURE — A9270 NON-COVERED ITEM OR SERVICE: HCPCS | Performed by: SURGERY

## 2019-01-23 PROCEDURE — 88112 CYTOPATH CELL ENHANCE TECH: CPT

## 2019-01-23 PROCEDURE — 700102 HCHG RX REV CODE 250 W/ 637 OVERRIDE(OP): Performed by: SURGERY

## 2019-01-23 RX ORDER — POTASSIUM CHLORIDE 20 MEQ/1
20 TABLET, EXTENDED RELEASE ORAL ONCE
Status: COMPLETED | OUTPATIENT
Start: 2019-01-23 | End: 2019-01-23

## 2019-01-23 RX ORDER — FUROSEMIDE 20 MG/1
20 TABLET ORAL ONCE
Status: COMPLETED | OUTPATIENT
Start: 2019-01-23 | End: 2019-01-23

## 2019-01-23 RX ADMIN — KETOROLAC TROMETHAMINE 30 MG: 30 INJECTION, SOLUTION INTRAMUSCULAR; INTRAVENOUS at 11:43

## 2019-01-23 RX ADMIN — ACETAMINOPHEN 1000 MG: 500 TABLET ORAL at 17:42

## 2019-01-23 RX ADMIN — ENOXAPARIN SODIUM 40 MG: 100 INJECTION SUBCUTANEOUS at 05:54

## 2019-01-23 RX ADMIN — NORETHINDRONE ACETATE 5 MG: 5 TABLET ORAL at 23:33

## 2019-01-23 RX ADMIN — KETOROLAC TROMETHAMINE 30 MG: 30 INJECTION, SOLUTION INTRAMUSCULAR; INTRAVENOUS at 17:42

## 2019-01-23 RX ADMIN — FUROSEMIDE 20 MG: 20 TABLET ORAL at 11:45

## 2019-01-23 RX ADMIN — ACETAMINOPHEN 1000 MG: 500 TABLET ORAL at 23:29

## 2019-01-23 RX ADMIN — POTASSIUM CHLORIDE 20 MEQ: 1500 TABLET, EXTENDED RELEASE ORAL at 11:45

## 2019-01-23 RX ADMIN — ACETAMINOPHEN 1000 MG: 500 TABLET ORAL at 05:54

## 2019-01-23 RX ADMIN — KETOROLAC TROMETHAMINE 30 MG: 30 INJECTION, SOLUTION INTRAMUSCULAR; INTRAVENOUS at 05:54

## 2019-01-23 RX ADMIN — ACETAMINOPHEN 1000 MG: 500 TABLET ORAL at 11:43

## 2019-01-23 RX ADMIN — OXYCODONE HYDROCHLORIDE 10 MG: 10 TABLET ORAL at 19:05

## 2019-01-23 RX ADMIN — NORETHINDRONE ACETATE 5 MG: 5 TABLET ORAL at 00:33

## 2019-01-23 ASSESSMENT — ENCOUNTER SYMPTOMS
VOMITING: 0
ABDOMINAL PAIN: 1
FEVER: 0
SHORTNESS OF BREATH: 1
CHILLS: 0
NAUSEA: 0

## 2019-01-23 NOTE — CARE PLAN
Problem: Safety  Goal: Will remain free from falls  Outcome: PROGRESSING AS EXPECTED  Patient ambulate with standby assist. Commode at bedside. Call light within reach. Bed in lowest and locked position.    Problem: Pain Management  Goal: Pain level will decrease to patient's comfort goal  Outcome: PROGRESSING AS EXPECTED  PRN pain medication was administered with positive effect.

## 2019-01-23 NOTE — CARE PLAN
Problem: Oxygenation:  Goal: Maintain adequate oxygenation dependent on patient condition  Outcome: PROGRESSING AS EXPECTED    Intervention: Manage oxygen therapy by monitoring pulse oximetry and/or ABG values  3L      Problem: Hyperinflation:  Goal: Prevent or improve atelectasis  Outcome: PROGRESSING AS EXPECTED    Intervention: Instruct incentive spirometry usage  750  Intervention: Perform hyperinflation therapy as indicated by assessment  PEP QID

## 2019-01-23 NOTE — PROGRESS NOTES
Trauma / Surgical Daily Progress Note    Date of Service  1/23/2019    Chief Complaint  43 y.o. female admitted 1/20/2019 with Acute abdomen    Interval Events  Ongoing supplemental 02 requirements  Tolerating diet  Chest x-ray with possible volume overload    - Lasix x1 today  - CBC pending  - Mobilize, out of bed for all meals  - Wean 02 as able  - AM labs / x-ray    Review of Systems  Review of Systems   Constitutional: Positive for malaise/fatigue. Negative for chills and fever.   Respiratory: Positive for shortness of breath (pain with deep inspiration).    Cardiovascular: Negative for chest pain.   Gastrointestinal: Positive for abdominal pain (incisional). Negative for nausea and vomiting.        1/22 BM per patient   Genitourinary:        Voiding          Vital Signs  Temp:  [36.1 °C (97 °F)-37.5 °C (99.5 °F)] 36.1 °C (97 °F)  Pulse:  [] 84  Resp:  [17-18] 18  BP: (137-145)/(68-88) 141/88  SpO2:  [94 %-95 %] 95 %    Physical Exam  Physical Exam   Constitutional: She is oriented to person, place, and time. She appears well-developed. No distress.   Cardiovascular: Normal rate.    Pulmonary/Chest: Effort normal. No respiratory distress. She has decreased breath sounds in the right lower field and the left lower field.      Abdominal: Soft. There is tenderness (incisional). There is no guarding.   Midline incision with dressing intact, old drainage noted to dressing   Musculoskeletal:   Moves all extremities   Neurological: She is alert and oriented to person, place, and time.   Skin: Skin is warm and dry.   Nursing note and vitals reviewed.      Laboratory  No results found for this or any previous visit (from the past 24 hour(s)).    Fluids    Intake/Output Summary (Last 24 hours) at 01/23/19 0978  Last data filed at 01/23/19 0437   Gross per 24 hour   Intake             1320 ml   Output             2600 ml   Net            -1280 ml       Core Measures & Quality Metrics  Labs reviewed and  Medications reviewed  Hathaway catheter: No Hathaway      DVT Prophylaxis: Enoxaparin (Lovenox)  DVT prophylaxis - mechanical: SCDs  Ulcer prophylaxis: Not indicated        Total Score: 0    ETOH Screening    Assessment/Plan  Anemia- (present on admission)   Assessment & Plan    Hemoglobin at sending facility 6.3  1/21 hemoglobin down to 4.9   - Transfuse 2 units of PRBCs    - Follow up Hgb 6.8 - transfuse 1 unit RPBC  1/22 Hgb 7.7   - Iron per pharmacy protocol       Acute abdomen- (present on admission)   Assessment & Plan    Peritonitis on exam. CT scan done at an outside facility, demonstrated free air.  OR for exploratory laparotomy with ovarian cyst excision  Reynaldo Gilmore MD Surgery      Menorrhagia- (present on admission)   Assessment & Plan    Premorbid  Will need pap smear and an endometrial biopsy for menorrhagia, may complete outpatient  Menstrual suppression in the short term with progesterone, norethindrone started 1/22.  Zarina To MD, OB/GYN     Ovarian cyst- (present on admission)   Assessment & Plan    Drained intraoperatively  Zarina To MD, OB/GYN         Discussed patient condition with RN, Patient and general surgery, Dr. Gilmore.

## 2019-01-23 NOTE — CARE PLAN
Problem: Oxygenation:  Goal: Maintain adequate oxygenation dependent on patient condition  Outcome: PROGRESSING AS EXPECTED    Intervention: Manage oxygen therapy by monitoring pulse oximetry and/or ABG values  Pt on 3L NC; SPO2 97%      Problem: Hyperinflation:  Goal: Prevent or improve atelectasis  Outcome: PROGRESSING AS EXPECTED    Intervention: Instruct incentive spirometry usage  Pt best IS value for today was 750; Pt's 60% is 1500  Intervention: Perform hyperinflation therapy as indicated by assessment  PEP QID

## 2019-01-24 ENCOUNTER — APPOINTMENT (OUTPATIENT)
Dept: RADIOLOGY | Facility: MEDICAL CENTER | Age: 44
DRG: 981 | End: 2019-01-24
Attending: NURSE PRACTITIONER
Payer: COMMERCIAL

## 2019-01-24 LAB
ANION GAP SERPL CALC-SCNC: 7 MMOL/L (ref 0–11.9)
BASOPHILS # BLD AUTO: 0.2 % (ref 0–1.8)
BASOPHILS # BLD: 0.04 K/UL (ref 0–0.12)
BUN SERPL-MCNC: 7 MG/DL (ref 8–22)
CALCIUM SERPL-MCNC: 9.3 MG/DL (ref 8.5–10.5)
CHLORIDE SERPL-SCNC: 106 MMOL/L (ref 96–112)
CO2 SERPL-SCNC: 26 MMOL/L (ref 20–33)
CREAT SERPL-MCNC: 0.64 MG/DL (ref 0.5–1.4)
CYTOLOGY REG CYTOL: NORMAL
EOSINOPHIL # BLD AUTO: 0.53 K/UL (ref 0–0.51)
EOSINOPHIL NFR BLD: 3.3 % (ref 0–6.9)
ERYTHROCYTE [DISTWIDTH] IN BLOOD BY AUTOMATED COUNT: 62.2 FL (ref 35.9–50)
GLUCOSE SERPL-MCNC: 89 MG/DL (ref 65–99)
HCT VFR BLD AUTO: 24.6 % (ref 37–47)
HGB BLD-MCNC: 7.1 G/DL (ref 12–16)
IMM GRANULOCYTES # BLD AUTO: 0.16 K/UL (ref 0–0.11)
IMM GRANULOCYTES NFR BLD AUTO: 1 % (ref 0–0.9)
LYMPHOCYTES # BLD AUTO: 2.14 K/UL (ref 1–4.8)
LYMPHOCYTES NFR BLD: 13.2 % (ref 22–41)
MCH RBC QN AUTO: 21.6 PG (ref 27–33)
MCHC RBC AUTO-ENTMCNC: 28.9 G/DL (ref 33.6–35)
MCV RBC AUTO: 74.8 FL (ref 81.4–97.8)
MONOCYTES # BLD AUTO: 1.5 K/UL (ref 0–0.85)
MONOCYTES NFR BLD AUTO: 9.2 % (ref 0–13.4)
NEUTROPHILS # BLD AUTO: 11.85 K/UL (ref 2–7.15)
NEUTROPHILS NFR BLD: 73.1 % (ref 44–72)
NRBC # BLD AUTO: 0.03 K/UL
NRBC BLD-RTO: 0.2 /100 WBC
PATHOLOGY CONSULT NOTE: NORMAL
PLATELET # BLD AUTO: 310 K/UL (ref 164–446)
PMV BLD AUTO: 8.5 FL (ref 9–12.9)
POTASSIUM SERPL-SCNC: 3.6 MMOL/L (ref 3.6–5.5)
RBC # BLD AUTO: 3.29 M/UL (ref 4.2–5.4)
SODIUM SERPL-SCNC: 139 MMOL/L (ref 135–145)
WBC # BLD AUTO: 16.2 K/UL (ref 4.8–10.8)

## 2019-01-24 PROCEDURE — 76856 US EXAM PELVIC COMPLETE: CPT

## 2019-01-24 PROCEDURE — 85025 COMPLETE CBC W/AUTO DIFF WBC: CPT

## 2019-01-24 PROCEDURE — 36415 COLL VENOUS BLD VENIPUNCTURE: CPT

## 2019-01-24 PROCEDURE — 700111 HCHG RX REV CODE 636 W/ 250 OVERRIDE (IP): Performed by: NURSE PRACTITIONER

## 2019-01-24 PROCEDURE — 94668 MNPJ CHEST WALL SBSQ: CPT

## 2019-01-24 PROCEDURE — 700102 HCHG RX REV CODE 250 W/ 637 OVERRIDE(OP): Performed by: SURGERY

## 2019-01-24 PROCEDURE — 770006 HCHG ROOM/CARE - MED/SURG/GYN SEMI*

## 2019-01-24 PROCEDURE — A9270 NON-COVERED ITEM OR SERVICE: HCPCS | Performed by: SURGERY

## 2019-01-24 PROCEDURE — 80048 BASIC METABOLIC PNL TOTAL CA: CPT

## 2019-01-24 PROCEDURE — 71045 X-RAY EXAM CHEST 1 VIEW: CPT

## 2019-01-24 RX ORDER — HYDRALAZINE HYDROCHLORIDE 20 MG/ML
10-20 INJECTION INTRAMUSCULAR; INTRAVENOUS EVERY 6 HOURS PRN
Status: DISCONTINUED | OUTPATIENT
Start: 2019-01-24 | End: 2019-01-25 | Stop reason: HOSPADM

## 2019-01-24 RX ADMIN — OXYCODONE HYDROCHLORIDE 5 MG: 5 TABLET ORAL at 16:25

## 2019-01-24 RX ADMIN — ACETAMINOPHEN 1000 MG: 500 TABLET ORAL at 11:33

## 2019-01-24 RX ADMIN — ACETAMINOPHEN 1000 MG: 500 TABLET ORAL at 05:34

## 2019-01-24 RX ADMIN — HYDRALAZINE HYDROCHLORIDE 10 MG: 20 INJECTION INTRAMUSCULAR; INTRAVENOUS at 22:11

## 2019-01-24 RX ADMIN — OXYCODONE HYDROCHLORIDE 5 MG: 5 TABLET ORAL at 22:20

## 2019-01-24 RX ADMIN — ENOXAPARIN SODIUM 40 MG: 100 INJECTION SUBCUTANEOUS at 05:34

## 2019-01-24 ASSESSMENT — ENCOUNTER SYMPTOMS
ABDOMINAL PAIN: 1
CHILLS: 0
SHORTNESS OF BREATH: 1
FEVER: 0
VOMITING: 0
NAUSEA: 0

## 2019-01-24 NOTE — PROGRESS NOTES
Ovarian cyst fluid was sent for micro rather than histology.  I spoke with micro lab, they might still have some fluid and will try to send it to histology for evaluation if possible.  Spoke with floor RN, prior TVUS results obtained without fibroid visualized, would recommend TVUS during stay if possible to obtain.        Zarina To MD  [facility], Women's Health

## 2019-01-24 NOTE — PROGRESS NOTES
Trauma / Surgical Daily Progress Note    Date of Service  1/24/2019    Chief Complaint  43 y.o. female admitted 1/20/2019 with Acute abdomen    Interval Events  No critical events overnight  Hemoglobin trend down, no overt signs of bleeding  Mobilizing more  IS improved  Gynecology recommendations reviewed    - Transvaginal ultrasound ordered per GYN recommendations  - Nursing to obtain walking and resting 02 saturations  - Continue mobilization    Review of Systems  Review of Systems   Constitutional: Negative for chills and fever.   Respiratory: Positive for shortness of breath (pain with deep inspiration).    Cardiovascular: Negative for chest pain.   Gastrointestinal: Positive for abdominal pain (incisional). Negative for nausea and vomiting.        1/22 BM per patient   Genitourinary:        Voiding        Vital Signs  Temp:  [36.7 °C (98.1 °F)-37.9 °C (100.3 °F)] 36.9 °C (98.4 °F)  Pulse:  [80-96] 85  Resp:  [16-18] 16  BP: (134-161)/() 136/79  SpO2:  [94 %-99 %] 95 %    Physical Exam  Physical Exam   Constitutional: She is oriented to person, place, and time. She appears well-developed. No distress.   Up in chair   Cardiovascular: Normal rate.    Pulmonary/Chest: Effort normal. No respiratory distress. She has decreased breath sounds in the right lower field and the left lower field.   IS 1000   Abdominal: Soft. There is tenderness (incisional). There is no guarding.   Midline incision with dressing intact   Musculoskeletal:   Moves all extremities    Neurological: She is alert and oriented to person, place, and time.   Skin: Skin is warm and dry.   Nursing note and vitals reviewed.      Laboratory  Recent Results (from the past 24 hour(s))   CBC WITH DIFFERENTIAL    Collection Time: 01/23/19 11:51 AM   Result Value Ref Range    WBC 18.8 (H) 4.8 - 10.8 K/uL    RBC 3.88 (L) 4.20 - 5.40 M/uL    Hemoglobin 8.1 (L) 12.0 - 16.0 g/dL    Hematocrit 29.2 (L) 37.0 - 47.0 %    MCV 75.3 (L) 81.4 - 97.8 fL    MCH  20.9 (L) 27.0 - 33.0 pg    MCHC 27.7 (L) 33.6 - 35.0 g/dL    RDW 62.4 (H) 35.9 - 50.0 fL    Platelet Count 379 164 - 446 K/uL    MPV 9.0 9.0 - 12.9 fL    Nucleated RBC 0.40 /100 WBC    NRBC (Absolute) 0.07 K/uL    Neutrophils-Polys 77.90 (H) 44.00 - 72.00 %    Lymphocytes 9.90 (L) 22.00 - 41.00 %    Monocytes 8.60 0.00 - 13.40 %    Eosinophils 2.40 0.00 - 6.90 %    Basophils 0.40 0.00 - 1.80 %    Immature Granulocytes 0.80 0.00 - 0.90 %    Neutrophils (Absolute) 14.63 (H) 2.00 - 7.15 K/uL    Lymphs (Absolute) 1.85 1.00 - 4.80 K/uL    Monos (Absolute) 1.61 (H) 0.00 - 0.85 K/uL    Eos (Absolute) 0.45 0.00 - 0.51 K/uL    Baso (Absolute) 0.07 0.00 - 0.12 K/uL    Immature Granulocytes (abs) 0.15 (H) 0.00 - 0.11 K/uL   CBC WITH DIFFERENTIAL    Collection Time: 01/24/19  3:20 AM   Result Value Ref Range    WBC 16.2 (H) 4.8 - 10.8 K/uL    RBC 3.29 (L) 4.20 - 5.40 M/uL    Hemoglobin 7.1 (L) 12.0 - 16.0 g/dL    Hematocrit 24.6 (L) 37.0 - 47.0 %    MCV 74.8 (L) 81.4 - 97.8 fL    MCH 21.6 (L) 27.0 - 33.0 pg    MCHC 28.9 (L) 33.6 - 35.0 g/dL    RDW 62.2 (H) 35.9 - 50.0 fL    Platelet Count 310 164 - 446 K/uL    MPV 8.5 (L) 9.0 - 12.9 fL    Neutrophils-Polys 73.10 (H) 44.00 - 72.00 %    Lymphocytes 13.20 (L) 22.00 - 41.00 %    Monocytes 9.20 0.00 - 13.40 %    Eosinophils 3.30 0.00 - 6.90 %    Basophils 0.20 0.00 - 1.80 %    Immature Granulocytes 1.00 (H) 0.00 - 0.90 %    Nucleated RBC 0.20 /100 WBC    Neutrophils (Absolute) 11.85 (H) 2.00 - 7.15 K/uL    Lymphs (Absolute) 2.14 1.00 - 4.80 K/uL    Monos (Absolute) 1.50 (H) 0.00 - 0.85 K/uL    Eos (Absolute) 0.53 (H) 0.00 - 0.51 K/uL    Baso (Absolute) 0.04 0.00 - 0.12 K/uL    Immature Granulocytes (abs) 0.16 (H) 0.00 - 0.11 K/uL    NRBC (Absolute) 0.03 K/uL   BASIC METABOLIC PANEL    Collection Time: 01/24/19  3:20 AM   Result Value Ref Range    Sodium 139 135 - 145 mmol/L    Potassium 3.6 3.6 - 5.5 mmol/L    Chloride 106 96 - 112 mmol/L    Co2 26 20 - 33 mmol/L    Glucose 89 65 -  99 mg/dL    Bun 7 (L) 8 - 22 mg/dL    Creatinine 0.64 0.50 - 1.40 mg/dL    Calcium 9.3 8.5 - 10.5 mg/dL    Anion Gap 7.0 0.0 - 11.9   ESTIMATED GFR    Collection Time: 01/24/19  3:20 AM   Result Value Ref Range    GFR If African American >60 >60 mL/min/1.73 m 2    GFR If Non African American >60 >60 mL/min/1.73 m 2       Fluids    Intake/Output Summary (Last 24 hours) at 01/24/19 1029  Last data filed at 01/24/19 0915   Gross per 24 hour   Intake              360 ml   Output             1300 ml   Net             -940 ml       Core Measures & Quality Metrics  Labs reviewed and Medications reviewed  Hathaway catheter: No Hathaway      DVT Prophylaxis: Enoxaparin (Lovenox)  DVT prophylaxis - mechanical: SCDs  Ulcer prophylaxis: Not indicated        Total Score: 0    ETOH Screening    Assessment/Plan  Anemia- (present on admission)   Assessment & Plan    Hemoglobin at sending facility 6.3  1/21 hemoglobin down to 4.9   - Transfuse 2 units of PRBCs    - Follow up Hgb 6.8 - transfuse 1 unit RPBC  1/22 Hgb 7.7   - Iron per pharmacy protocol        Acute abdomen- (present on admission)   Assessment & Plan    Peritonitis on exam. CT scan done at an outside facility, demonstrated free air.  OR for exploratory laparotomy with ovarian cyst excision  Reynaldo Gilmore MD Surgery       Menorrhagia- (present on admission)   Assessment & Plan    Premorbid  Will need pap smear and an endometrial biopsy for menorrhagia, may complete outpatient  Menstrual suppression in the short term with progesterone, norethindrone started 1/22.  1/24 Transvaginal ultrasound ordered  Zarina To MD, OB/GYN     Ovarian cyst- (present on admission)   Assessment & Plan    Drained intraoperatively   Zarina To MD, OB/GYN         Discussed patient condition with RN, Patient and general surgery, Dr. Gilmore.

## 2019-01-24 NOTE — PROGRESS NOTES
Assumed pt care at 0715.  Received report from romel RN.  Assessment completed.  Pt AAOx4.  Pt has no comlaints of pain at this time.  No other s/s of discomfort or distress. Pt ambulates with standby assist.  Bed in lowest position and locked.  O2 weaned from 2 to 3L.   Pt calls appropriately.  Treaded socks in place, call light within reach and staff numbers provided.  Pt needs met at this time.

## 2019-01-24 NOTE — CARE PLAN
Problem: Safety  Goal: Will remain free from falls  Outcome: NOT MET  Call light within reach. Bed in lowest and locked position. Commode at bedside.    Problem: Pain Management  Goal: Pain level will decrease to patient's comfort goal  Outcome: PROGRESSING AS EXPECTED  PRN pain medication administered from abdominal pain.

## 2019-01-25 VITALS
HEART RATE: 82 BPM | WEIGHT: 193.56 LBS | HEIGHT: 65 IN | OXYGEN SATURATION: 94 % | TEMPERATURE: 98.6 F | RESPIRATION RATE: 17 BRPM | BODY MASS INDEX: 32.25 KG/M2 | DIASTOLIC BLOOD PRESSURE: 84 MMHG | SYSTOLIC BLOOD PRESSURE: 152 MMHG

## 2019-01-25 PROBLEM — R10.0 ACUTE ABDOMEN: Status: RESOLVED | Noted: 2019-01-21 | Resolved: 2019-01-25

## 2019-01-25 LAB
ANION GAP SERPL CALC-SCNC: 10 MMOL/L (ref 0–11.9)
BACTERIA SPEC ANAEROBE CULT: NORMAL
BASOPHILS # BLD AUTO: 0.5 % (ref 0–1.8)
BASOPHILS # BLD: 0.07 K/UL (ref 0–0.12)
BUN SERPL-MCNC: 7 MG/DL (ref 8–22)
CALCIUM SERPL-MCNC: 9.3 MG/DL (ref 8.5–10.5)
CHLORIDE SERPL-SCNC: 106 MMOL/L (ref 96–112)
CO2 SERPL-SCNC: 22 MMOL/L (ref 20–33)
CREAT SERPL-MCNC: 0.61 MG/DL (ref 0.5–1.4)
EOSINOPHIL # BLD AUTO: 0.47 K/UL (ref 0–0.51)
EOSINOPHIL NFR BLD: 3.6 % (ref 0–6.9)
ERYTHROCYTE [DISTWIDTH] IN BLOOD BY AUTOMATED COUNT: 64.5 FL (ref 35.9–50)
GLUCOSE SERPL-MCNC: 89 MG/DL (ref 65–99)
HCT VFR BLD AUTO: 27.1 % (ref 37–47)
HGB BLD-MCNC: 7.9 G/DL (ref 12–16)
IMM GRANULOCYTES # BLD AUTO: 0.17 K/UL (ref 0–0.11)
IMM GRANULOCYTES NFR BLD AUTO: 1.3 % (ref 0–0.9)
LYMPHOCYTES # BLD AUTO: 2.3 K/UL (ref 1–4.8)
LYMPHOCYTES NFR BLD: 17.7 % (ref 22–41)
MCH RBC QN AUTO: 22 PG (ref 27–33)
MCHC RBC AUTO-ENTMCNC: 29.2 G/DL (ref 33.6–35)
MCV RBC AUTO: 75.5 FL (ref 81.4–97.8)
MONOCYTES # BLD AUTO: 1.22 K/UL (ref 0–0.85)
MONOCYTES NFR BLD AUTO: 9.4 % (ref 0–13.4)
NEUTROPHILS # BLD AUTO: 8.77 K/UL (ref 2–7.15)
NEUTROPHILS NFR BLD: 67.5 % (ref 44–72)
NRBC # BLD AUTO: 0.07 K/UL
NRBC BLD-RTO: 0.5 /100 WBC
PLATELET # BLD AUTO: 334 K/UL (ref 164–446)
PMV BLD AUTO: 8.9 FL (ref 9–12.9)
POTASSIUM SERPL-SCNC: 3.4 MMOL/L (ref 3.6–5.5)
RBC # BLD AUTO: 3.59 M/UL (ref 4.2–5.4)
SIGNIFICANT IND 70042: NORMAL
SITE SITE: NORMAL
SODIUM SERPL-SCNC: 138 MMOL/L (ref 135–145)
SOURCE SOURCE: NORMAL
WBC # BLD AUTO: 13 K/UL (ref 4.8–10.8)

## 2019-01-25 PROCEDURE — 700102 HCHG RX REV CODE 250 W/ 637 OVERRIDE(OP): Performed by: OBSTETRICS & GYNECOLOGY

## 2019-01-25 PROCEDURE — 49322 LAPAROSCOPY ASPIRATION: CPT | Performed by: OBSTETRICS & GYNECOLOGY

## 2019-01-25 PROCEDURE — 700111 HCHG RX REV CODE 636 W/ 250 OVERRIDE (IP): Performed by: NURSE PRACTITIONER

## 2019-01-25 PROCEDURE — A9270 NON-COVERED ITEM OR SERVICE: HCPCS | Performed by: OBSTETRICS & GYNECOLOGY

## 2019-01-25 PROCEDURE — 80048 BASIC METABOLIC PNL TOTAL CA: CPT

## 2019-01-25 PROCEDURE — 99231 SBSQ HOSP IP/OBS SF/LOW 25: CPT | Mod: 25 | Performed by: OBSTETRICS & GYNECOLOGY

## 2019-01-25 PROCEDURE — A9270 NON-COVERED ITEM OR SERVICE: HCPCS | Performed by: SURGERY

## 2019-01-25 PROCEDURE — 36415 COLL VENOUS BLD VENIPUNCTURE: CPT

## 2019-01-25 PROCEDURE — 85025 COMPLETE CBC W/AUTO DIFF WBC: CPT

## 2019-01-25 PROCEDURE — 700102 HCHG RX REV CODE 250 W/ 637 OVERRIDE(OP): Performed by: SURGERY

## 2019-01-25 RX ORDER — ACETAMINOPHEN 500 MG
1000 TABLET ORAL EVERY 6 HOURS
Qty: 30 TAB | Refills: 0 | COMMUNITY
Start: 2019-01-25

## 2019-01-25 RX ORDER — OXYCODONE HYDROCHLORIDE 5 MG/1
5 TABLET ORAL EVERY 4 HOURS PRN
Qty: 15 TAB | Refills: 0 | Status: SHIPPED | OUTPATIENT
Start: 2019-01-25 | End: 2019-02-01

## 2019-01-25 RX ADMIN — NORETHINDRONE ACETATE 5 MG: 5 TABLET ORAL at 06:08

## 2019-01-25 RX ADMIN — ACETAMINOPHEN 1000 MG: 500 TABLET ORAL at 06:08

## 2019-01-25 RX ADMIN — ENOXAPARIN SODIUM 40 MG: 100 INJECTION SUBCUTANEOUS at 06:08

## 2019-01-25 RX ADMIN — ACETAMINOPHEN 1000 MG: 500 TABLET ORAL at 00:08

## 2019-01-25 ASSESSMENT — ENCOUNTER SYMPTOMS
VOMITING: 0
MYALGIAS: 0
FEVER: 0
ROS GI COMMENTS: 1/24 BM
ABDOMINAL PAIN: 1
NAUSEA: 0
CHILLS: 0
SHORTNESS OF BREATH: 0

## 2019-01-25 NOTE — PROGRESS NOTES
Trauma / Surgical Daily Progress Note    Date of Service  1/25/2019    Chief Complaint  43 y.o. female admitted 1/20/2019 with Acute abdomen  POD #5 exploratory laparotomy, cyst excision    Interval Events  Oxygen weaned off  Pain control adequate  Pelvic ultrasound completed  Disk of images given to patient    - Discharge home today  - Follow up with gynecology upon return to [place]    Review of Systems  Review of Systems   Constitutional: Negative for chills and fever.   Respiratory: Negative for shortness of breath.    Cardiovascular: Negative for chest pain.   Gastrointestinal: Positive for abdominal pain (incisional). Negative for nausea and vomiting.        1/24 BM   Genitourinary:        Voiding   Musculoskeletal: Negative for myalgias.        Vital Signs  Temp:  [37 °C (98.6 °F)-37.3 °C (99.1 °F)] 37 °C (98.6 °F)  Pulse:  [80-99] 80  Resp:  [17] 17  BP: (155-169)/() 155/90  SpO2:  [92 %-98 %] 93 %    Physical Exam  Physical Exam   Constitutional: She is oriented to person, place, and time. She appears well-developed. No distress.   Up in chair   Cardiovascular: Normal rate.    Pulmonary/Chest: Effort normal. No respiratory distress. She has decreased breath sounds in the right lower field and the left lower field.   IS 1000    Abdominal: Soft. There is tenderness (incisional). There is no guarding.   Midline incision with dressing intact    Musculoskeletal:   Moves all extremities   Neurological: She is alert and oriented to person, place, and time.   Skin: Skin is warm and dry.   Nursing note and vitals reviewed.      Laboratory  Recent Results (from the past 24 hour(s))   HISTOLOGY REQUEST    Collection Time: 01/24/19 10:38 AM   Result Value Ref Range    Pathology Request Sent to [facility]    CYTOLOGY    Collection Time: 01/24/19 10:38 AM   Result Value Ref Range    Cytology Reg See Path Report    CBC WITH DIFFERENTIAL    Collection Time: 01/25/19  3:24 AM   Result Value Ref Range    WBC 13.0 (H)  4.8 - 10.8 K/uL    RBC 3.59 (L) 4.20 - 5.40 M/uL    Hemoglobin 7.9 (L) 12.0 - 16.0 g/dL    Hematocrit 27.1 (L) 37.0 - 47.0 %    MCV 75.5 (L) 81.4 - 97.8 fL    MCH 22.0 (L) 27.0 - 33.0 pg    MCHC 29.2 (L) 33.6 - 35.0 g/dL    RDW 64.5 (H) 35.9 - 50.0 fL    Platelet Count 334 164 - 446 K/uL    MPV 8.9 (L) 9.0 - 12.9 fL    Neutrophils-Polys 67.50 44.00 - 72.00 %    Lymphocytes 17.70 (L) 22.00 - 41.00 %    Monocytes 9.40 0.00 - 13.40 %    Eosinophils 3.60 0.00 - 6.90 %    Basophils 0.50 0.00 - 1.80 %    Immature Granulocytes 1.30 (H) 0.00 - 0.90 %    Nucleated RBC 0.50 /100 WBC    Neutrophils (Absolute) 8.77 (H) 2.00 - 7.15 K/uL    Lymphs (Absolute) 2.30 1.00 - 4.80 K/uL    Monos (Absolute) 1.22 (H) 0.00 - 0.85 K/uL    Eos (Absolute) 0.47 0.00 - 0.51 K/uL    Baso (Absolute) 0.07 0.00 - 0.12 K/uL    Immature Granulocytes (abs) 0.17 (H) 0.00 - 0.11 K/uL    NRBC (Absolute) 0.07 K/uL   BASIC METABOLIC PANEL    Collection Time: 01/25/19  3:24 AM   Result Value Ref Range    Sodium 138 135 - 145 mmol/L    Potassium 3.4 (L) 3.6 - 5.5 mmol/L    Chloride 106 96 - 112 mmol/L    Co2 22 20 - 33 mmol/L    Glucose 89 65 - 99 mg/dL    Bun 7 (L) 8 - 22 mg/dL    Creatinine 0.61 0.50 - 1.40 mg/dL    Calcium 9.3 8.5 - 10.5 mg/dL    Anion Gap 10.0 0.0 - 11.9   ESTIMATED GFR    Collection Time: 01/25/19  3:24 AM   Result Value Ref Range    GFR If African American >60 >60 mL/min/1.73 m 2    GFR If Non African American >60 >60 mL/min/1.73 m 2       Fluids    Intake/Output Summary (Last 24 hours) at 01/25/19 0803  Last data filed at 01/24/19 1815   Gross per 24 hour   Intake              840 ml   Output             1000 ml   Net             -160 ml       Core Measures & Quality Metrics  Labs reviewed and Medications reviewed  Hathaway catheter: No Hathaway      DVT Prophylaxis: Enoxaparin (Lovenox)  DVT prophylaxis - mechanical: SCDs  Ulcer prophylaxis: Not indicated        Total Score: 0    ETOH Screening    Assessment/Plan  Anemia- (present on  admission)   Assessment & Plan    Hemoglobin at sending facility 6.3  1/21 hemoglobin down to 4.9   - Transfuse 2 units of PRBCs    - Follow up Hgb 6.8 - transfuse 1 unit RPBC  1/22 Hgb 7.7   - Iron per pharmacy protocol     1/24 Hemoglobin stable     Acute abdomen- (present on admission)   Assessment & Plan    Peritonitis on exam. CT scan done at an outside facility, demonstrated free air.  OR for exploratory laparotomy with ovarian cyst excision  Reynaldo Gilmore MD Surgery     Menorrhagia- (present on admission)   Assessment & Plan    Premorbid  Will need pap smear and an endometrial biopsy for menorrhagia, may complete outpatient  Menstrual suppression in the short term with progesterone, norethindrone started 1/22.  1/24 Pelvic ultrasound completed with a probable uterine fundal fibroid which does not affect the endometrial complex.  An incidental 2.6 cm septated left ovarian cyst, possibly resolving hemorrhagic cyst  Zarina To MD, OB/GYN     Ovarian cyst- (present on admission)   Assessment & Plan    Drained intraoperatively   Zarina To MD, OB/GYN         Discussed patient condition with RN, Patient and general surgery, Dr. Gilmore.

## 2019-01-25 NOTE — DISCHARGE INSTRUCTIONS
Discharge Instructions    Discharged to home by car with relative. Discharged via wheelchair, hospital escort: Yes.  Special equipment needed: Not Applicable    Be sure to schedule a follow-up appointment with your primary care doctor or any specialists as instructed.     Discharge Plan:   Influenza Vaccine Indication: Patient Refuses    I understand that a diet low in cholesterol, fat, and sodium is recommended for good health. Unless I have been given specific instructions below for another diet, I accept this instruction as my diet prescription.   Other diet: Regular    Special Instructions:  - Call or seek medical attention for questions or concerns   - Follow up with gynecology upon return to [place]   - Follow up with  as needed   - Follow up with primary care provider within one weeks time   - Follow up with Urgent Care, Emergency Department, Primary Care Provider in 5 days for staple removal   - Resume regular diet   - May take over the counter acetaminophen as needed for pain   - Continue daily over the counter stool softener while on narcotics   - No operation of machinery or motorized vehicles while under the influence of narcotics   - No alcohol, marijuana or illicit drug use while under the influence of narcotics   - No swimming, hot tubs, baths or wound submersion until cleared by outpatient provider. May shower   - No contact sports, strenuous activities, or heavy lifting until cleared by outpatient provider   - If respiratory decompensation, increased pain, or signs or symptoms of infection occur seek medical attention    · Is patient discharged on Warfarin / Coumadin?   No   Exploratory Laparotomy, Adult  Introduction  Exploratory laparotomy is a surgical procedure to examine the organs inside your belly (abdomen). Another name for this is abdominal exploration. You may have this procedure if you have abdominal pain, trauma, bleeding, infection, or obstruction. The procedure may be  done if your health care provider cannot make a diagnosis from only an exam and testing.  Exploratory laparotomy may be a planned procedure or an emergency procedure. You may have surgical treatment as part of the laparotomy, or you may have additional treatment after your laparotomy. This will depend on what your surgeon finds during the procedure.  Tell a health care provider about:  · Any allergies you have.  · All medicines you are taking, including vitamins, herbs, eye drops, creams, and over-the-counter medicines.  · Any problems you or family members have had with anesthetic medicines.  · Any blood disorders you have.  · Any surgeries you have had.  · Any medical conditions you have.  What are the risks?  Generally, this is a safe procedure. However, problems can occur and include:  · Bleeding.  · Infection.  · A blood clot that forms in your leg and travels to your lungs.  · Damage to organs inside your abdomen.  · Scar tissue that blocks your digestive tract.  What happens before the procedure?  · Ask your health care provider about:  ¨ Changing or stopping your regular medicines. This is especially important if you are taking diabetes medicines or blood thinners.  ¨ Taking medicines such as aspirin and ibuprofen. These medicines can thin your blood. Do not take these medicines before your procedure if your health care provider instructs you not to.  · Do not eat or drink anything after midnight on the night before the procedure or as directed by your health care provider.  · You may be given instructions for clearing out your bowel before surgery (bowel prep). If you are already in the hospital, the bowel prep may be done there.  What happens during the procedure?  · An IV tube may be inserted into a vein. You may receive fluids and medicine through the IV tube. This may include antibiotic medicine to treat or prevent infection.  · You will be given a medicine that makes you go to sleep (general  anesthetic).  · You may have a tube placed through your nose and into your stomach (nasogastric tube) to drain your stomach fluids.  · You may have a tube placed into your bladder (urinary catheter) to drain urine.  · Your abdomen will be cleaned with a germ-killing solution (antiseptic).  · The surgeon will make a surgical cut (incision) in your abdomen. This is usually an up-and-down incision in the midsection of your abdomen. The incision will go through the inside lining of your abdomen (peritoneum).  · Your surgeon will spread the incision wide enough to examine the inside of your abdomen.  · The rest of the procedure will depend on what the surgeon finds:  ¨ The surgeon will check all organs in your abdomen for damage or obstruction. Repairs will be made when possible.  ¨ If there is blood in the abdomen, the surgeon will look for the source of the bleeding in order to stop it.  ¨ If there is yellowish-white fluid (pus) or gastric fluids in your abdomen, the surgeon will check for an infection or a hole (perforation) in your digestive tract.  ¨ If the surgeon finds infection, a drain may be placed to empty fluid that can build up in your abdomen after surgery.  ¨ If there is a growth (tumor) inside your abdomen, the surgeon may remove a piece of the growth (biopsy) to examine it under a microscope.  · When all procedures are complete, the surgeon will close your abdomen with layers of stitches (sutures).  · The incision through the skin of your abdomen will be closed with sutures or staples.  What happens after the procedure?  · Your blood pressure, heart rate, breathing rate, and blood oxygen level will be monitored often until the medicines you were given have worn off.  · You will continue to receive fluids and nutrition through your IV tube. This will stop when you can eat and drink on your own.  · You may also get antibiotic medicine and pain medicine through your IV tube.  · Your nasogastric tube may be  removed when you start to pass gas.  · Your urinary catheter may be removed when the anesthetic wears off.  This information is not intended to replace advice given to you by your health care provider. Make sure you discuss any questions you have with your health care provider.  Document Released: 09/12/2002 Document Revised: 05/25/2017 Document Reviewed: 08/05/2015  © 2017 [employer]    Depression / Suicide Risk    As you are discharged from this [facility] facility, it is important to learn how to keep safe from harming yourself.    Recognize the warning signs:  · Abrupt changes in personality, positive or negative- including increase in energy   · Giving away possessions  · Change in eating patterns- significant weight changes-  positive or negative  · Change in sleeping patterns- unable to sleep or sleeping all the time   · Unwillingness or inability to communicate  · Depression  · Unusual sadness, discouragement and loneliness  · Talk of wanting to die  · Neglect of personal appearance   · Rebelliousness- reckless behavior  · Withdrawal from people/activities they love  · Confusion- inability to concentrate     If you or a loved one observes any of these behaviors or has concerns about self-harm, here's what you can do:  · Talk about it- your feelings and reasons for harming yourself  · Remove any means that you might use to hurt yourself (examples: pills, rope, extension cords, firearm)  · Get professional help from the community (Mental Health, Substance Abuse, psychological counseling)  · Do not be alone:Call your Safe Contact- someone whom you trust who will be there for you.  · Call your local CRISIS HOTLINE 202-7017 or 532-360-1382  · Call your local Children's Mobile Crisis Response Team [place] (827) 971-8904 or www.[employer]  · Call the toll free National Suicide Prevention Hotlines   · National Suicide Prevention Lifeline 738-440-BLEI (3097)  · National Hope Line Network 800-SUICIDE  (947-9911)

## 2019-01-25 NOTE — DISCHARGE SUMMARY
General Surgery Discharge Summary    DATE OF ADMISSION: 1/20/2019    DATE OF DISCHARGE: 1/25/2019    LENGTH OF STAY: 5 days    ATTENDING PHYSICIAN: Reynaldo Gilmore M.D.    CONSULTING PHYSICIAN:   1.  Dr. Bacilio Flowers, obstetrics and gynecology  2.  Dr. Zarina To, obstetrics and gynecology    DISCHARGE DIAGNOSIS:  1.  Anemia  2.  Acute abdomen, resolved  3.  Ovarian cyst  4.  Menorrhagia    PROCEDURES:  1. Procedure completed by Dr. Gilmore and Dr. Flowers on 1/20/2019, exploratory laparotomy, cyst excision.    HISTORY OF PRESENT ILLNESS: The patient is a 43 y.o. female who presented to the emergency department for further evaluation of abdominal pain. Work up revealed a peritonitis, there was also noted free air on a CAT scan done at outlying facility.    HOSPITAL COURSE: The patient was admitted to the critical care team under the direction and supervision of Dr. Gilmore.  She sustained the listed diagnosis and incurred the listed procedure during her stay.    She was transferred from the emergency department to the operating room for the above listed procedure.    Postoperatively she was transferred to general surgical costa.  She progressed as medically expected postoperatively.  She was noted to have some anemia which required a total of 3 units of packed red blood cell transfusion.  She also received iron replacement per protocol.  She underwent a pelvic ultrasound per the recommendations of the gynecologist which demonstrated a probable uterine fundal fibroid.  She has arranged to follow-up with gynecology up on return to her home in [place].    On the day of discharge she is ambulating well, her oxygen saturations are greater than 90% on room air, she reports adequate pain control she is tolerating a regular diet and having regular bowel movement.    DISCHARGE PHYSICAL EXAM: See epic physical exam dated 1/25/2019    DISCHARGE MEDICATIONS:  I reviewed the patients controlled substance history  and obtained a controlled substance use informed consent (if applicable) provided by [facility] and the patient has been prescribed.       Medication List      START taking these medications      Instructions   acetaminophen 500 MG Tabs  Commonly known as:  TYLENOL   Doctor's comments:  Take every 6 hours as needed  Take 2 Tabs by mouth every 6 hours.  Dose:  1000 mg     norethindrone 5 MG tablet  Start taking on:  1/26/2019  Commonly known as:  AYGESTIN   Take 1 Tab by mouth every day.  Dose:  5 mg     oxyCODONE immediate-release 5 MG Tabs  Commonly known as:  ROXICODONE   Take 1 Tab by mouth every four hours as needed for up to 7 days.  Dose:  5 mg            DISPOSITION: The patient will be discharged home in stable condition on 1/25/2019.  She will follow up with her home gynecologist upon return to [place], she will follow-up with her primary care provider or urgent care for staple removal in 5 days.     The patient and family have been extensively counseled and all questions have been answered. Special attention was paid to respiratory decompensation, increased pain and signs and symptoms of infection and to seek immediate medical attention if these develop. The patient and family demonstrate understanding and give verbal compliance with discharge instructions.    TIME SPENT ON DISCHARGE: 30 minutes        ____________________________________________  CHANG Lopez.    DD: 1/25/2019 10:07 AM

## 2019-01-25 NOTE — CARE PLAN
Problem: Knowledge Deficit  Goal: Knowledge of disease process/condition, treatment plan, diagnostic tests, and medications will improve  Patient educated on new medication. Verbalized understanding.    Problem: Pain Management  Goal: Pain level will decrease to patient's comfort goal  Outcome: PROGRESSING AS EXPECTED  Patient medicated with PRN medication for pain.

## 2019-01-25 NOTE — PROGRESS NOTES
Patient discharged with sisters to catch flight home at 1pm. Antiembolic stocking ordered for patient flight per request All lines removed. Discharge instructions and prescription reviewed and understanding verbalized. Patient states they will fill prescriptions. Patient states they will return to emergency if discussed symptoms arise. Patient left walking with this RN. Medications from drawer removed and sent back to pharmacy or disposed of per protocol. Chart given to unit clerk.

## 2019-01-25 NOTE — CARE PLAN
Problem: Safety  Goal: Will remain free from injury  Outcome: PROGRESSING AS EXPECTED  Patient free from accidentally injury at this time. Safety precautions in place. Hourly rounding in place.     Problem: Pain Management  Goal: Pain level will decrease to patient's comfort goal  Outcome: PROGRESSING AS EXPECTED  Patient experiencing pain relief with MAR medication. Patient encouraged to call if pain worsens. Hourly rounding in place.

## 2019-01-25 NOTE — PROGRESS NOTES
Bedside report received.  Assessment complete.  A&O x 4. Patient calls appropriately.  Patient up self and steady.   Patient denies pain medication at this time.  Denies N&V. Tolerating diet.  Surgical incision to midline, dressing CDI.  + void, + flatus  Patient denies SOB.  Patient anticipating discharge.  Review plan with of care with patient. Call light and personal belongings with in reach. Hourly rounding in place. All needs met at this time.

## 2022-12-28 NOTE — PROGRESS NOTES
Assumed care of patient from night shift RN  43 year old female  Full code  NKA  Admitted 1/20 d/t abd pain  Pain 4/10 denying pain medication at this time, patient repositioned  A&O x 4  4 L nc  Cardiac: tachy  LBM 1/21  Regular diet  Voiding in bedside commode  PIV: 20 g L AC TKO  Skin: Midline abd incision with island dressing and old drainage  No fall risk per justen kate  Plan: wean O2  All questions answered and all needs met at this time. Bed in low, locked position. Call light within reach. RN will implement hourly rounding and answer call lights appropriately.    [FreeTextEntry1] : reviewed cardiology f/u

## (undated) DEVICE — SET LEADWIRE 5 LEAD BEDSIDE DISPOSABLE ECG (1SET OF 5/EA)

## (undated) DEVICE — TUBE CONNECT SUCTION CLEAR 120 X 1/4" (50EA/CA)"

## (undated) DEVICE — SUTURE 3-0 VICRYL PLUS SH - 8X 18 INCH (12/BX)

## (undated) DEVICE — SUTURE 1 VICRYL PLUS CTX - 8 X 18 INCH (12/BX)

## (undated) DEVICE — MASK ANESTHESIA ADULT  - (100/CA)

## (undated) DEVICE — LACTATED RINGERS INJ 1000 ML - (14EA/CA 60CA/PF)

## (undated) DEVICE — SET EXTENSION WITH 2 PORTS (48EA/CA) ***PART #2C8610 IS A SUBSTITUTE*****

## (undated) DEVICE — GOWN WARMING STANDARD FLEX - (30/CA)

## (undated) DEVICE — ELECTRODE 850 FOAM ADHESIVE - HYDROGEL RADIOTRNSPRNT (50/PK)

## (undated) DEVICE — KIT ANESTHESIA W/CIRCUIT & 3/LT BAG W/FILTER (20EA/CA)

## (undated) DEVICE — PACK MAJOR BASIN - (2EA/CA)

## (undated) DEVICE — SUTURE 1 PDS II PLUS TP-1 - (12PK/BX)

## (undated) DEVICE — SENSOR SPO2 NEO LNCS ADHESIVE (20/BX) SEE USER NOTES

## (undated) DEVICE — GLOVE BIOGEL PI INDICATOR SZ 7.0 SURGICAL PF LF - (50/BX 4BX/CA)

## (undated) DEVICE — SLEEVE, VASO, THIGH, MED

## (undated) DEVICE — DRESSING LEUKOMED STERILE 11.75X4IN - (50/CA)

## (undated) DEVICE — CHLORAPREP 26 ML APPLICATOR - ORANGE TINT(25/CA)

## (undated) DEVICE — TRAY SURESTEP FOLEY TEMP SENSING 16FR (10EA/CA) ORDER  #18764 FOR TEMP FOLEY ONLY

## (undated) DEVICE — NEPTUNE 4 PORT MANIFOLD - (20/PK)

## (undated) DEVICE — STAPLER SKIN DISP - (6/BX 10BX/CA) VISISTAT

## (undated) DEVICE — SUTURE GENERAL

## (undated) DEVICE — DRAPE LAPAROTOMY T SHEET - (12EA/CA)

## (undated) DEVICE — SPONGE GAUZESTER 4 X 4 4PLY - (128PK/CA)

## (undated) DEVICE — SUTURE 2-0 SILK 12 X 18" (36PK/BX)"

## (undated) DEVICE — DRAPE MAYO STAND - (30/CA)

## (undated) DEVICE — SODIUM CHL IRRIGATION 0.9% 1000ML (12EA/CA)

## (undated) DEVICE — GLOVE BIOGEL SZ 7 SURGICAL PF LTX - (50PR/BX 4BX/CA)

## (undated) DEVICE — CANISTER SUCTION 3000ML MECHANICAL FILTER AUTO SHUTOFF MEDI-VAC NONSTERILE LF DISP  (40EA/CA)

## (undated) DEVICE — ELECTRODE DUAL RETURN W/ CORD - (50/PK)

## (undated) DEVICE — PROTECTOR ULNA NERVE - (36PR/CA)

## (undated) DEVICE — HEAD HOLDER JUNIOR/ADULT

## (undated) DEVICE — GLOVE BIOGEL SZ 8 SURGICAL PF LTX - (50PR/BX 4BX/CA)

## (undated) DEVICE — SUCTION INSTRUMENT YANKAUER BULBOUS TIP W/O VENT (50EA/CA)

## (undated) DEVICE — TUBING CLEARLINK DUO-VENT - C-FLO (48EA/CA)

## (undated) DEVICE — SUTURE 2-0 SILK SH C/R ETHICON (12PK/BX)

## (undated) DEVICE — RESERVOIR SUCTION 100 CC - SILICONE (20EA/CA)

## (undated) DEVICE — DRAIN JACKSON PRATT 10FR - (10/CS)

## (undated) DEVICE — GOWN SURGEONS X-LARGE - DISP. (30/CA)

## (undated) DEVICE — DRAPE MEDI-SLUSH STERILE  - (40/CA)

## (undated) DEVICE — KIT ROOM DECONTAMINATION